# Patient Record
Sex: MALE | Race: BLACK OR AFRICAN AMERICAN | NOT HISPANIC OR LATINO | Employment: OTHER | URBAN - METROPOLITAN AREA
[De-identification: names, ages, dates, MRNs, and addresses within clinical notes are randomized per-mention and may not be internally consistent; named-entity substitution may affect disease eponyms.]

---

## 2022-07-11 DIAGNOSIS — D35.2 BENIGN NEOPLASM OF PITUITARY GLAND: Primary | ICD-10-CM

## 2022-07-21 ENCOUNTER — TELEPHONE (OUTPATIENT)
Dept: NEUROSURGERY | Facility: CLINIC | Age: 67
End: 2022-07-21
Payer: MEDICARE

## 2022-07-21 DIAGNOSIS — D35.2 PITUITARY ADENOMA: Primary | ICD-10-CM

## 2022-07-21 NOTE — TELEPHONE ENCOUNTER
Called patient to inquire about if he has an endocrinologist/ophthalmologist. Patient denied having either so I informed him that I would send a referral to Dr. Lopez and [facility]. Referrals were faxed and I will call Dr. Doshi's office to schedule eye exam and visual field test per MM instructions.

## 2022-07-22 NOTE — TELEPHONE ENCOUNTER
Called Dr. Doshi's office and sched the patient's eye exam and visual field test for 7-26-22 @ 8:30am

## 2022-08-01 ENCOUNTER — TELEPHONE (OUTPATIENT)
Dept: NEUROSURGERY | Facility: CLINIC | Age: 67
End: 2022-08-01
Payer: MEDICARE

## 2022-08-01 NOTE — TELEPHONE ENCOUNTER
I discussed the patient and his situation with Dr. Vasquez.  The MRIs were reviewed.  The lesion has grown over time.  Dr. Vasquez favors excision being it has grown.  It will likely continue to grow.  It is okay to watch if the patient desires as long as not hypersecreting tumor.      You can add him to a Wednesday in my schedule on a day Dr. Vasquez is here.  No new imaging.

## 2022-08-25 NOTE — TELEPHONE ENCOUNTER
The patient was originally scheduled on 9/14/22 on  AA day per MM. Since then we have found out Dr. Vasquez will not be in office so his appt had to be rescheduled to 9/28/22. Pt verbalized understanding and was compliant with appt date and time.

## 2022-09-28 ENCOUNTER — OFFICE VISIT (OUTPATIENT)
Dept: NEUROSURGERY | Facility: CLINIC | Age: 67
End: 2022-09-28
Payer: MEDICARE

## 2022-09-28 VITALS
WEIGHT: 171.63 LBS | BODY MASS INDEX: 28.6 KG/M2 | SYSTOLIC BLOOD PRESSURE: 130 MMHG | DIASTOLIC BLOOD PRESSURE: 83 MMHG | HEART RATE: 85 BPM | HEIGHT: 65 IN | RESPIRATION RATE: 18 BRPM

## 2022-09-28 DIAGNOSIS — M47.812 SPONDYLOSIS OF CERVICAL REGION WITHOUT MYELOPATHY OR RADICULOPATHY: ICD-10-CM

## 2022-09-28 DIAGNOSIS — D35.2 BENIGN NEOPLASM OF PITUITARY GLAND: Primary | ICD-10-CM

## 2022-09-28 PROCEDURE — 99204 PR OFFICE/OUTPT VISIT, NEW, LEVL IV, 45-59 MIN: ICD-10-PCS | Mod: ,,, | Performed by: PHYSICIAN ASSISTANT

## 2022-09-28 PROCEDURE — 99204 OFFICE O/P NEW MOD 45 MIN: CPT | Mod: ,,, | Performed by: PHYSICIAN ASSISTANT

## 2022-09-28 RX ORDER — FLUTICASONE PROPIONATE 50 UG/1
2 POWDER, METERED RESPIRATORY (INHALATION) 2 TIMES DAILY
COMMUNITY

## 2022-09-28 RX ORDER — TADALAFIL 20 MG/1
20 TABLET ORAL DAILY
COMMUNITY
End: 2024-03-20

## 2022-09-28 RX ORDER — LEVOCETIRIZINE DIHYDROCHLORIDE 5 MG/1
5 TABLET, FILM COATED ORAL NIGHTLY
COMMUNITY

## 2022-09-28 RX ORDER — TAMSULOSIN HYDROCHLORIDE 0.4 MG/1
1 CAPSULE ORAL NIGHTLY
COMMUNITY
Start: 2022-05-31

## 2022-09-28 RX ORDER — METFORMIN HYDROCHLORIDE 500 MG/1
500 TABLET ORAL 2 TIMES DAILY WITH MEALS
COMMUNITY

## 2022-09-28 RX ORDER — CETIRIZINE HYDROCHLORIDE 10 MG/1
TABLET ORAL DAILY
COMMUNITY
Start: 2021-10-28 | End: 2022-11-28

## 2022-09-28 RX ORDER — ROSUVASTATIN CALCIUM 20 MG/1
20 TABLET, COATED ORAL DAILY
COMMUNITY
End: 2024-03-20

## 2022-09-28 NOTE — PROGRESS NOTES
"[facility]  History & Physical  Neurosurgery      Jacques Garcia   [id number]   1955     SUBJECTIVE:     Chief Complaint    History of Present Illness:   Patient is a 67 y.o. male is seen today for pituitary tumor.  The patient was found to have a pituitary tumor incidentally after suffering a TIA in 2019.  At the time, he experienced transient blurred vision followed by numbness in the left side of his face and tongue.  He has followed the pituitary lesion with serial imaging with his family physician.  The most recent pituitary MRI with and without contrast on 5/25/2022 reveal an increase in size of the right sided lesion.  There is also "increasingly evident smaller hypo-enhancing 4 mm mas in the left lob of the pituitary".  He was then referred to our office for neurosurgical evaluation and care.      Currently, he has no complaints.  He denies visual disturbances.  He was evaluated by Dr. Lavonne Curtis on 7/26/2022.  Visual field testing was intact.  He is treated for diabetes mellitus which he reports is under control with medication.  He does not believe he has undergone lab work for the pituitary gland in the past.  He is to see Dr. Casimiro Lopez soon.    In addition, he has been seen by Dr. James Almanza in the past for cervical radiculopathy.  Surgery was recommended.  However, the patient declined.  His symptoms improved.  Currently, the neck pain is manageable.  The radiculopathy resolved.  He continues with numbness in the left first and second fingers which is not a problem for him.      Past Medical History:   Diagnosis Date    BPH (benign prostatic hyperplasia)     Cervical disc disorder     DM (diabetes mellitus)     HLD (hyperlipidemia)     HTN (hypertension)     Insomnia     Prostate cancer 2016    treated with radiation        History reviewed. No surgical history.       Review of patient's allergies indicates:  No Known Allergies       Social History     Tobacco Use    Smoking " "status: Never    Smokeless tobacco: Never   Substance Use Topics    Alcohol use: Yes     Comment: socially        Family History   Problem Relation Age of Onset    Hypertension Father     Diabetes Father         Review of Systems   Constitutional:  Negative for chills and fever.   HENT:  Negative for congestion and hearing loss.    Eyes:  Negative for blurred vision and double vision.   Respiratory:  Negative for cough and wheezing.    Cardiovascular:  Negative for chest pain and palpitations.   Gastrointestinal:  Negative for nausea and vomiting.   Genitourinary:  Negative for urgency.   Musculoskeletal:  Positive for neck pain.   Skin:  Negative for rash.   Neurological:  Negative for dizziness and headaches.   Endo/Heme/Allergies:  Negative for polydipsia.   Psychiatric/Behavioral:  Negative for hallucinations.        OBJECTIVE:       /83 (BP Location: Other (Comment), Patient Position: Sitting)   Pulse 85   Resp 18   Ht 5' 5" (1.651 m)   Wt 77.8 kg (171 lb 9.6 oz)   BMI 28.56 kg/m²       General:  healthy, alert, no distress     Head:  Normocephalic, without obvious abnormality, atraumatic    CV:  Neck is supple.  There are no carotid bruits.  Heart has regular rate and rhythm    Lungs:   Clear to ascultation bilaterally    Abdomen:  Soft, non-tender, non-distended    Neurological:    Oriented to Person, Place, Time   Speech is normal.  Memory, cognition, and affect are appropriate.  Pupils are equal, round, and reactive to light,  Extraocular movements are intact.  Visual fields are full to confrontation.  Sensation is intact throughout face as well as extremities.  Movements of facial expression are intact and symmetric.  Motor examination reveals 5/5 strength throughout upper and lower extremities with exception of 5-/5 strength with bilateral elbow extension.  Gait:  normal  He has a little difficulty with walking in tandem      Imaging:  The patient presents today with MRI of the brain and " pituitary from 5/25/2020, 05/03/2021, and 05/25/2022.  As above there has been increase in size of the pituitary lesion in the right lobe.  In addition, there is increasing evidence of lesion at at the left side of the pituitary as well.      ASSESSMENT/PLAN:     1. Benign neoplasm of pituitary gland      MRI Pituitary W W/O Contrast      2. Spondylosis of cervical region without myelopathy or radiculopathy            The patient and his situation was discussed with Dr. Vasquez prior to his visit.  All the patient's imaging was reviewed by Dr. Vasquez.  Being the lesion has increased in size, Dr. Vasquez favors excision of the pituitary lesion.  However, if the tumor is non secreting, Dr. Vasquez is okay with continued surveillance if the patient desires.  In discussing with the patient, he is not interested in surgery at this time.  He will see Dr. Lopez in the near future.  We will have him return for follow-up with repeat pituitary MRI at 2 months and follow-up thereafter.  This will be 6 months after the last pituitary MRI.      A total of 31 minutes was spent face-to-face with the patient during this encounter.  Over half of that time was spent on counseling and coordination of care.  Additional time was used to reviewed the patient's chart including notes from Dr. Biswas and Dr. Curtis, MRI brain imaging and reports comparing the most recent to the older, and work on office note.

## 2022-10-04 PROBLEM — M47.812 SPONDYLOSIS OF CERVICAL REGION WITHOUT MYELOPATHY OR RADICULOPATHY: Status: ACTIVE | Noted: 2022-10-04

## 2022-11-02 ENCOUNTER — TELEPHONE (OUTPATIENT)
Dept: NEUROSURGERY | Facility: CLINIC | Age: 67
End: 2022-11-02
Payer: MEDICARE

## 2022-11-28 ENCOUNTER — OFFICE VISIT (OUTPATIENT)
Dept: NEUROSURGERY | Facility: CLINIC | Age: 67
End: 2022-11-28
Payer: MEDICARE

## 2022-11-28 VITALS
DIASTOLIC BLOOD PRESSURE: 85 MMHG | WEIGHT: 174 LBS | SYSTOLIC BLOOD PRESSURE: 145 MMHG | HEART RATE: 87 BPM | BODY MASS INDEX: 28.99 KG/M2 | HEIGHT: 65 IN | RESPIRATION RATE: 18 BRPM

## 2022-11-28 DIAGNOSIS — D35.2 BENIGN NEOPLASM OF PITUITARY GLAND: Primary | ICD-10-CM

## 2022-11-28 DIAGNOSIS — M47.812 SPONDYLOSIS OF CERVICAL REGION WITHOUT MYELOPATHY OR RADICULOPATHY: ICD-10-CM

## 2022-11-28 PROCEDURE — 99215 PR OFFICE/OUTPT VISIT, EST, LEVL V, 40-54 MIN: ICD-10-PCS | Mod: ,,, | Performed by: PHYSICIAN ASSISTANT

## 2022-11-28 PROCEDURE — 99215 OFFICE O/P EST HI 40 MIN: CPT | Mod: ,,, | Performed by: PHYSICIAN ASSISTANT

## 2022-11-28 RX ORDER — OXYCODONE AND ACETAMINOPHEN 10; 325 MG/1; MG/1
TABLET ORAL EVERY 12 HOURS PRN
Status: ON HOLD | COMMUNITY
End: 2023-03-29 | Stop reason: SDUPTHER

## 2022-11-28 RX ORDER — LOSARTAN POTASSIUM 25 MG/1
TABLET ORAL DAILY
COMMUNITY
End: 2023-01-09

## 2022-11-28 RX ORDER — MONTELUKAST SODIUM 10 MG/1
10 TABLET ORAL NIGHTLY
COMMUNITY
End: 2023-07-28

## 2022-11-28 NOTE — PROGRESS NOTES
"[facility]  History & Physical  Neurosurgery      Jacques Garcia   [id number]   1955       SUBJECTIVE:     Chief Complaint:    Follow-up pituitary tumor    History of Present Illness:   Patient is a 67 y.o. male is seen today in follow-up with repeat MRI pituitary.  The patient was found to have a pituitary tumor incidentally after suffering a TIA in 2019.  At the time, he experienced transient blurred vision followed by numbness in the left side of his face and tongue.  He has followed the pituitary lesion with serial imaging with his family physician.  The most recent pituitary MRI with and without contrast on 5/25/2022 reveal an increase in size of the right sided lesion.  There is also "increasingly evident smaller hypo-enhancing 4 mm mas in the left lob of the pituitary".      I saw the patient in the office on 09/28/2022.  He is asymptomatic.  He has been seen by Dr. Casimiro Lopez.  Pituitary lesion is nonfunctioning.      Past Medical History:   Diagnosis Date    BPH (benign prostatic hyperplasia)     Cervical disc disorder     DM (diabetes mellitus)     HLD (hyperlipidemia)     HTN (hypertension)     Insomnia     Prostate cancer 2016    treated with radiation        History reviewed. No pertinent surgical history.     Social History     Tobacco Use    Smoking status: Never    Smokeless tobacco: Never   Substance Use Topics    Alcohol use: Yes     Comment: socially        Family History   Problem Relation Age of Onset    Hypertension Father     Diabetes Father         Review of patient's allergies indicates:  No Known Allergies     Medication List with Changes/Refills   Current Medications    FLUTICASONE PROPIONATE (FLOVENT DISKUS) 50 MCG/ACTUATION DSDV    Inhale into the lungs. Controller    LEVOCETIRIZINE (XYZAL) 5 MG TABLET    Take 5 mg by mouth every evening.    LOSARTAN (COZAAR) 25 MG TABLET    once daily.    METFORMIN (GLUCOPHAGE) 500 MG TABLET    Take 500 mg by mouth 2 (two) times " "daily with meals.    MONTELUKAST (SINGULAIR) 10 MG TABLET    Take 10 mg by mouth every evening.    OXYCODONE-ACETAMINOPHEN (PERCOCET)  MG PER TABLET    every 12 (twelve) hours as needed.    ROSUVASTATIN (CRESTOR) 20 MG TABLET    Take 20 mg by mouth once daily.    TADALAFIL (CIALIS) 20 MG TAB    Take 20 mg by mouth once daily.    TAMSULOSIN (FLOMAX) 0.4 MG CAP    Take 1 capsule by mouth every evening.   Discontinued Medications    CETIRIZINE (ZYRTEC) 10 MG TABLET    once daily.        Review of Systems   Constitutional:  Negative for chills and fever.   HENT:  Negative for congestion and hearing loss.    Eyes:  Negative for blurred vision and double vision.   Respiratory:  Negative for cough and wheezing.    Cardiovascular:  Negative for chest pain and palpitations.   Gastrointestinal:  Negative for nausea and vomiting.   Genitourinary:  Negative for urgency.   Musculoskeletal:  Positive for neck pain.   Skin:  Negative for rash.   Neurological:  Negative for dizziness and headaches.   Endo/Heme/Allergies:  Negative for polydipsia.   Psychiatric/Behavioral:  Negative for hallucinations.        OBJECTIVE:     Vital Signs:  BP (!) 145/85 (BP Location: Other (Comment), Patient Position: Sitting)   Pulse 87   Resp 18   Ht 5' 5" (1.651 m)   Wt 78.9 kg (174 lb)   BMI 28.96 kg/m²      General:  healthy, alert, no distress    Head:  Normocephalic, without obvious abnormality, atraumatic    Lungs:   Breathing is quiet, non-lablored    Neurological:    Oriented to Person, Place, Time   Speech is normal.  Memory, cognition, and affect are appropriate.  Pupils are equal, round, and reactive to light,  Extraocular movements are intact.  Visual fields are full to confrontation.  Sensation is intact throughout face as well as extremities.  Movements of facial expression are intact and symmetric.  Motor examination reveals 5/5 strength throughout upper and lower extremities with exception of 5-/5 strength with bilateral " elbow extension.  Gait:  normal  He has a little difficulty with walking in tandem        Imaging:  The patient presents today with MRI of the brain and pituitary from 5/25/2020, 05/03/2021, and 05/25/2022.  As above there has been increase in size of the pituitary lesion in the right lobe.  In addition, there is increasing evidence of lesion at at the left side of the pituitary as well.  MRI of the pituitary with and without contrast was obtained on 11/21/2022.  Compared to MRI from 05/25/2022, there is stable appearance of the pituitary gland and lesions.      ASSESSMENT/PLAN:     1. Benign neoplasm of pituitary gland        2. Spondylosis of cervical region without myelopathy or radiculopathy          The patient was seen and examined by Dr. Vasquez.  Options were discussed at length.  This included continued observation with serial images versus surgical excision.  Risks, benefits, and possible complications were discussed.  All the patient's questions were answered.  He would like to proceed with surgery for excision of pituitary tumor.  We will have the patient see Dr. Reyes and plan accordingly for transnasal transsphenoidal pituitary tumor resection.      A total of 37 minutes was spent face-to-face with the patient during this encounter.  Over half of that time was spent on counseling and coordination of care.  Additional time was used to reviewed the patient's chart, MRI pituitary, discussed with Dr. Vasquez, and work on office note.          Nataly Hurtado PA-C

## 2022-12-01 ENCOUNTER — TELEPHONE (OUTPATIENT)
Dept: NEUROSURGERY | Facility: CLINIC | Age: 67
End: 2022-12-01
Payer: MEDICARE

## 2022-12-01 DIAGNOSIS — D35.2 BENIGN NEOPLASM OF PITUITARY GLAND: Primary | ICD-10-CM

## 2022-12-01 NOTE — TELEPHONE ENCOUNTER
----- Message from Nataly Hurtado PA-C sent at 11/28/2022  1:49 PM CST -----  Regarding: surgery  He will be TSH for pituitary tumor.  He will need to see [place].

## 2023-01-09 ENCOUNTER — OFFICE VISIT (OUTPATIENT)
Dept: NEUROSURGERY | Facility: CLINIC | Age: 68
End: 2023-01-09
Payer: MEDICARE

## 2023-01-09 ENCOUNTER — HOSPITAL ENCOUNTER (OUTPATIENT)
Dept: RADIOLOGY | Facility: HOSPITAL | Age: 68
Discharge: HOME | End: 2023-01-09
Attending: NURSE PRACTITIONER
Payer: MEDICARE

## 2023-01-09 VITALS
RESPIRATION RATE: 16 BRPM | SYSTOLIC BLOOD PRESSURE: 125 MMHG | HEIGHT: 65 IN | DIASTOLIC BLOOD PRESSURE: 80 MMHG | HEART RATE: 67 BPM | WEIGHT: 177 LBS | BODY MASS INDEX: 29.49 KG/M2

## 2023-01-09 DIAGNOSIS — D68.9 COAGULATION DEFECT: ICD-10-CM

## 2023-01-09 DIAGNOSIS — I10 HYPERTENSION, UNSPECIFIED TYPE: ICD-10-CM

## 2023-01-09 DIAGNOSIS — E11.9 TYPE 2 DIABETES MELLITUS WITHOUT COMPLICATION, UNSPECIFIED WHETHER LONG TERM INSULIN USE: ICD-10-CM

## 2023-01-09 DIAGNOSIS — D35.2 BENIGN NEOPLASM OF PITUITARY GLAND: Primary | ICD-10-CM

## 2023-01-09 PROCEDURE — 71046 X-RAY EXAM CHEST 2 VIEWS: CPT | Mod: TC

## 2023-01-09 PROCEDURE — 99213 OFFICE O/P EST LOW 20 MIN: CPT | Mod: ,,, | Performed by: NURSE PRACTITIONER

## 2023-01-09 PROCEDURE — 99213 PR OFFICE/OUTPT VISIT, EST, LEVL III, 20-29 MIN: ICD-10-PCS | Mod: ,,, | Performed by: NURSE PRACTITIONER

## 2023-01-09 RX ORDER — ALBUTEROL SULFATE 90 UG/1
2 AEROSOL, METERED RESPIRATORY (INHALATION) EVERY 4 HOURS PRN
COMMUNITY

## 2023-01-09 RX ORDER — PREDNISONE 20 MG/1
TABLET ORAL
Status: ON HOLD | COMMUNITY
End: 2023-03-28 | Stop reason: CLARIF

## 2023-01-09 RX ORDER — LOSARTAN POTASSIUM 50 MG/1
50 TABLET ORAL DAILY
COMMUNITY
End: 2024-03-20

## 2023-01-09 NOTE — PATIENT INSTRUCTIONS
-Take flomax and losartan the morning of surgery with a sip of water. Otherwise, nothing to eat or drink after midnight the night before surgery.

## 2023-01-09 NOTE — PROGRESS NOTES
"[facility]  Office Visit  Neurosurgery  Jacques Garcia  [id number]  1955    HPI:  The patient presents today for pre-operative appointment. Mr. Garcia is a 67 y.o. male with incidental finding of pituitary tumor during workup for TIA in 2019.   At the time, he experienced transient blurred vision followed by numbness in the left side of his face and tongue.  He has followed the pituitary lesion with serial imaging with his family physician.  Pituitary MRI with and without contrast on 5/25/2022 revealed an increase in size of the right sided lesion.  There was also "increasingly evident smaller hypo-enhancing 4 mm mass in the left lobe of the pituitary".  He was then referred to our office for neurosurgical evaluation and care.  He returned to the office in November with repeat MRI pituitary which revealed stable appearance of pituitary gland and lesions. Surgery was recommended at that time. He presents today for his pre-operative appointment and is ready to proceed with surgery as planned.      He remains asymptomatic from pituitary lesions. He denies any visual disturbances or headaches.  He was evaluated by Dr. Lavonne Curtis on 7/26/2022.  Visual field testing was intact. He has been evaluated by Dr. Lopez and tumor is nonfunctioning. He denies any changes in bowel or bladder function.     Review of patient's allergies indicates:  No Known Allergies  Current Outpatient Medications   Medication Sig Dispense Refill    albuterol (PROVENTIL/VENTOLIN HFA) 90 mcg/actuation inhaler as needed.      fluticasone propionate (FLOVENT DISKUS) 50 mcg/actuation DsDv Inhale into the lungs. Controller      levocetirizine (XYZAL) 5 MG tablet Take 5 mg by mouth every evening.      losartan (COZAAR) 50 MG tablet once daily.      metFORMIN (GLUCOPHAGE) 500 MG tablet Take 500 mg by mouth 2 (two) times daily with meals.      montelukast (SINGULAIR) 10 mg tablet Take 10 mg by mouth every evening.      " "oxyCODONE-acetaminophen (PERCOCET)  mg per tablet every 12 (twelve) hours as needed.      rosuvastatin (CRESTOR) 20 MG tablet Take 20 mg by mouth once daily.      tadalafiL (CIALIS) 20 MG Tab Take 20 mg by mouth once daily.      tamsulosin (FLOMAX) 0.4 mg Cap Take 1 capsule by mouth every evening.      predniSONE (DELTASONE) 20 MG tablet prednisone 20 mg tablet   Take 1 tablet every day by oral route for 5 days.       No current facility-administered medications for this visit.     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  Social History     Tobacco Use    Smoking status: Never    Smokeless tobacco: Never   Substance Use Topics    Alcohol use: Yes     Comment: socially     Family History   Problem Relation Age of Onset    Hypertension Father     Diabetes Father        Vital Signs  Pulse: 67  Resp: 16  BP: 125/80  BP Location: Other (Comment)  Patient Position: Sitting  Pain Score: 0-No pain  Height and Weight  Height: 5' 5" (165.1 cm)  Weight: 80.3 kg (177 lb)  BSA (Calculated - sq m): 1.92 sq meters  BMI (Calculated): 29.5  Weight in (lb) to have BMI = 25: 149.9]    ROS:  Review of Systems   All other systems reviewed and are negative.    Vitals within last 24hrs:  Vitals:    01/09/23 0906   BP: 125/80   Pulse: 67   Resp: 16       Physical Exam:  General: well developed, well nourished, no distress.   Head: normocephalic, atraumatic  Respiratory: respiration non-labored; clear to " auscultation  Cardiac: RRR, No murmur  GI: abd soft, non-tender, non-distended  Pulses: 2+ and symmetric radial and dorsalis pedis. No lower extremity edema  Neurologic: Alert and oriented. Thought content appropriate.  GCS: Motor: 6/Verbal: 5/Eyes: 4 GCS Total: 15  Mental Status: Awake, Alert, Oriented x3  Cranial nerves: face symmetric, tongue midline, CN II-XII grossly intact.   Eyes: pupils equal, round, reactive to light with accomodation, EOMI.   Sensory: intact to light touch throughout  Motor Strength:Moves all extremities spontaneously with good tone.  Full strength upper and lower extremities. No abnormal movements seen  Gait: normal      Assessment/Plan:  Problem List Items Addressed This Visit          Endocrine    Benign neoplasm of pituitary gland - Primary    Relevant Orders    CBC Auto Differential    Comprehensive Metabolic Panel    MRI Brain Stealth     Other Visit Diagnoses       Type 2 diabetes mellitus without complication, unspecified whether long term insulin use        Relevant Orders    CBC Auto Differential    Comprehensive Metabolic Panel    Hypertension, unspecified type        Relevant Orders    CBC Auto Differential    Comprehensive Metabolic Panel    X-Ray Chest PA And Lateral    EKG 12-lead    Coagulation defect        Relevant Orders    CBC Auto Differential    Comprehensive Metabolic Panel    Protime-INR    APTT          PLAN  The nature of the procedure, as well as its attendant risks, were discussed in detail with the patient.  All questions were answered.  They are agreement with proceeding with surgery as planned, and are tentatively scheduled for endoscopic transnasal, trans-sphenoidal pituitary tumor resection with Dr. Sofia on 1/9/2023.      ROCAEL Campos-AVELINOP

## 2023-01-12 ENCOUNTER — TELEPHONE (OUTPATIENT)
Dept: NEUROSURGERY | Facility: CLINIC | Age: 68
End: 2023-01-12
Payer: MEDICARE

## 2023-01-12 DIAGNOSIS — D35.3 BENIGN NEOPLASM OF PITUITARY GLAND AND CRANIOPHARYNGEAL DUCT (POUCH): ICD-10-CM

## 2023-01-12 DIAGNOSIS — D35.2 BENIGN NEOPLASM OF PITUITARY GLAND: Primary | ICD-10-CM

## 2023-01-12 DIAGNOSIS — D35.2 BENIGN NEOPLASM OF PITUITARY GLAND AND CRANIOPHARYNGEAL DUCT (POUCH): ICD-10-CM

## 2023-01-12 RX ORDER — MUPIROCIN 20 MG/G
1 OINTMENT TOPICAL 2 TIMES DAILY
Status: CANCELLED | OUTPATIENT
Start: 2023-01-12 | End: 2023-01-13

## 2023-01-13 ENCOUNTER — LAB VISIT (OUTPATIENT)
Dept: LAB | Facility: HOSPITAL | Age: 68
End: 2023-01-13
Attending: NEUROLOGICAL SURGERY
Payer: MEDICARE

## 2023-01-13 ENCOUNTER — TELEPHONE (OUTPATIENT)
Dept: NEUROSURGERY | Facility: CLINIC | Age: 68
End: 2023-01-13
Payer: MEDICARE

## 2023-01-13 DIAGNOSIS — R79.1 ABNORMAL PARTIAL THROMBOPLASTIN TIME (PTT): ICD-10-CM

## 2023-01-13 DIAGNOSIS — R79.1 ABNORMAL PARTIAL THROMBOPLASTIN TIME (PTT): Primary | ICD-10-CM

## 2023-01-13 DIAGNOSIS — D68.9 COAGULATION DEFECT: Primary | ICD-10-CM

## 2023-01-13 DIAGNOSIS — D68.9 COAGULATION DEFECT: ICD-10-CM

## 2023-01-13 PROCEDURE — 82043 UR ALBUMIN QUANTITATIVE: CPT | Mod: 59

## 2023-01-13 PROCEDURE — 85730 THROMBOPLASTIN TIME PARTIAL: CPT | Mod: 59

## 2023-01-13 PROCEDURE — 36415 COLL VENOUS BLD VENIPUNCTURE: CPT

## 2023-01-16 LAB
APTT PPP: 37.5 SECONDS (ref 23.2–33.7)
PROTHROMBIN TIME: 12.4 SECONDS (ref 12.2–14.7)

## 2023-01-17 DIAGNOSIS — R79.1 ELEVATED PARTIAL THROMBOPLASTIN TIME (PTT): Primary | ICD-10-CM

## 2023-01-18 ENCOUNTER — TELEPHONE (OUTPATIENT)
Dept: NEUROSURGERY | Facility: CLINIC | Age: 68
End: 2023-01-18
Payer: MEDICARE

## 2023-01-19 NOTE — TELEPHONE ENCOUNTER
Returned patient's call. Had to leave a message. Of note, we referred him to Dr. Wooten.  I do see he has an appt with Hem/Onc on 1/23/23.

## 2023-01-23 ENCOUNTER — OFFICE VISIT (OUTPATIENT)
Dept: HEMATOLOGY/ONCOLOGY | Facility: CLINIC | Age: 68
End: 2023-01-23
Payer: MEDICARE

## 2023-01-23 VITALS
WEIGHT: 174.69 LBS | HEART RATE: 63 BPM | TEMPERATURE: 98 F | BODY MASS INDEX: 29.11 KG/M2 | RESPIRATION RATE: 16 BRPM | OXYGEN SATURATION: 98 % | HEIGHT: 65 IN | DIASTOLIC BLOOD PRESSURE: 85 MMHG | SYSTOLIC BLOOD PRESSURE: 150 MMHG

## 2023-01-23 DIAGNOSIS — R79.1 ELEVATED PARTIAL THROMBOPLASTIN TIME (PTT): ICD-10-CM

## 2023-01-23 LAB
ALBUMIN SERPL-MCNC: 4.2 G/DL (ref 3.4–4.8)
ALBUMIN/GLOB SERPL: 1.7 RATIO (ref 1.1–2)
ALP SERPL-CCNC: 62 UNIT/L (ref 40–150)
ALT SERPL-CCNC: 17 UNIT/L (ref 0–55)
APTT PPP: 39.7 SECONDS (ref 23.2–33.7)
AST SERPL-CCNC: 13 UNIT/L (ref 5–34)
BASOPHILS # BLD AUTO: 0.03 X10(3)/MCL (ref 0–0.2)
BASOPHILS NFR BLD AUTO: 0.7 %
BILIRUBIN DIRECT+TOT PNL SERPL-MCNC: 0.4 MG/DL
BUN SERPL-MCNC: 10.1 MG/DL (ref 8.4–25.7)
CALCIUM SERPL-MCNC: 9.5 MG/DL (ref 8.8–10)
CHLORIDE SERPL-SCNC: 110 MMOL/L (ref 98–107)
CO2 SERPL-SCNC: 26 MMOL/L (ref 23–31)
CREAT SERPL-MCNC: 0.78 MG/DL (ref 0.73–1.18)
EOSINOPHIL # BLD AUTO: 0.31 X10(3)/MCL (ref 0–0.9)
EOSINOPHIL NFR BLD AUTO: 6.8 %
ERYTHROCYTE [DISTWIDTH] IN BLOOD BY AUTOMATED COUNT: 14.7 % (ref 11.5–17)
FACT VIII ACT/NOR PPP: 126 % (ref 59–191)
FIBRINOGEN PPP-MCNC: 326 MG/DL (ref 210–463)
GFR SERPLBLD CREATININE-BSD FMLA CKD-EPI: >60 MLS/MIN/1.73/M2
GLOBULIN SER-MCNC: 2.5 GM/DL (ref 2.4–3.5)
GLUCOSE SERPL-MCNC: 92 MG/DL (ref 82–115)
HCT VFR BLD AUTO: 39.6 % (ref 42–52)
HGB BLD-MCNC: 12.4 GM/DL (ref 14–18)
IMM GRANULOCYTES # BLD AUTO: 0 X10(3)/MCL (ref 0–0.04)
IMM GRANULOCYTES NFR BLD AUTO: 0 %
INR BLD: 0.99 (ref 0–1.3)
LYMPHOCYTES # BLD AUTO: 1.23 X10(3)/MCL (ref 0.6–4.6)
LYMPHOCYTES NFR BLD AUTO: 26.8 %
MCH RBC QN AUTO: 26.2 PG
MCHC RBC AUTO-ENTMCNC: 31.3 MG/DL (ref 33–36)
MCV RBC AUTO: 83.5 FL (ref 80–94)
MONOCYTES # BLD AUTO: 0.43 X10(3)/MCL (ref 0.1–1.3)
MONOCYTES NFR BLD AUTO: 9.4 %
NEUTROPHILS # BLD AUTO: 2.59 X10(3)/MCL (ref 2.1–9.2)
NEUTROPHILS NFR BLD AUTO: 56.3 %
PLATELET # BLD AUTO: 259 X10(3)/MCL (ref 130–400)
PMV BLD AUTO: 10.1 FL (ref 7.4–10.4)
POTASSIUM SERPL-SCNC: 4.5 MMOL/L (ref 3.5–5.1)
PROT SERPL-MCNC: 6.7 GM/DL (ref 5.8–7.6)
PROTHROMBIN TIME: 13 SECONDS (ref 12.5–14.5)
RBC # BLD AUTO: 4.74 X10(6)/MCL (ref 4.7–6.1)
SODIUM SERPL-SCNC: 142 MMOL/L (ref 136–145)
TT IMM BOVINE THROMBIN PPP: 16 SECONDS (ref 0–22)
WBC # SPEC AUTO: 4.6 X10(3)/MCL (ref 4.5–11.5)

## 2023-01-23 PROCEDURE — 85240 CLOT FACTOR VIII AHG 1 STAGE: CPT | Performed by: STUDENT IN AN ORGANIZED HEALTH CARE EDUCATION/TRAINING PROGRAM

## 2023-01-23 PROCEDURE — 99999 PR PBB SHADOW E&M-EST. PATIENT-LVL IV: ICD-10-PCS | Mod: PBBFAC,,, | Performed by: STUDENT IN AN ORGANIZED HEALTH CARE EDUCATION/TRAINING PROGRAM

## 2023-01-23 PROCEDURE — 85730 THROMBOPLASTIN TIME PARTIAL: CPT | Performed by: STUDENT IN AN ORGANIZED HEALTH CARE EDUCATION/TRAINING PROGRAM

## 2023-01-23 PROCEDURE — 85613 RUSSELL VIPER VENOM DILUTED: CPT | Performed by: STUDENT IN AN ORGANIZED HEALTH CARE EDUCATION/TRAINING PROGRAM

## 2023-01-23 PROCEDURE — 85670 THROMBIN TIME PLASMA: CPT | Performed by: STUDENT IN AN ORGANIZED HEALTH CARE EDUCATION/TRAINING PROGRAM

## 2023-01-23 PROCEDURE — 99205 PR OFFICE/OUTPT VISIT, NEW, LEVL V, 60-74 MIN: ICD-10-PCS | Mod: S$PBB,,, | Performed by: STUDENT IN AN ORGANIZED HEALTH CARE EDUCATION/TRAINING PROGRAM

## 2023-01-23 PROCEDURE — 85025 COMPLETE CBC W/AUTO DIFF WBC: CPT | Performed by: STUDENT IN AN ORGANIZED HEALTH CARE EDUCATION/TRAINING PROGRAM

## 2023-01-23 PROCEDURE — 99999 PR PBB SHADOW E&M-EST. PATIENT-LVL IV: CPT | Mod: PBBFAC,,, | Performed by: STUDENT IN AN ORGANIZED HEALTH CARE EDUCATION/TRAINING PROGRAM

## 2023-01-23 PROCEDURE — 36415 COLL VENOUS BLD VENIPUNCTURE: CPT | Performed by: STUDENT IN AN ORGANIZED HEALTH CARE EDUCATION/TRAINING PROGRAM

## 2023-01-23 PROCEDURE — 99205 OFFICE O/P NEW HI 60 MIN: CPT | Mod: S$PBB,,, | Performed by: STUDENT IN AN ORGANIZED HEALTH CARE EDUCATION/TRAINING PROGRAM

## 2023-01-23 PROCEDURE — 85610 PROTHROMBIN TIME: CPT | Performed by: STUDENT IN AN ORGANIZED HEALTH CARE EDUCATION/TRAINING PROGRAM

## 2023-01-23 PROCEDURE — 85384 FIBRINOGEN ACTIVITY: CPT

## 2023-01-23 PROCEDURE — 85610 PROTHROMBIN TIME: CPT | Mod: 91 | Performed by: STUDENT IN AN ORGANIZED HEALTH CARE EDUCATION/TRAINING PROGRAM

## 2023-01-23 PROCEDURE — 85730 THROMBOPLASTIN TIME PARTIAL: CPT | Mod: 59 | Performed by: STUDENT IN AN ORGANIZED HEALTH CARE EDUCATION/TRAINING PROGRAM

## 2023-01-23 PROCEDURE — 80053 COMPREHEN METABOLIC PANEL: CPT | Performed by: STUDENT IN AN ORGANIZED HEALTH CARE EDUCATION/TRAINING PROGRAM

## 2023-01-23 PROCEDURE — 99214 OFFICE O/P EST MOD 30 MIN: CPT | Mod: PBBFAC | Performed by: STUDENT IN AN ORGANIZED HEALTH CARE EDUCATION/TRAINING PROGRAM

## 2023-01-23 NOTE — PROGRESS NOTES
Subjective:       Patient ID: Jacques Garcia is a 68 y.o. male.    Chief Complaint: No chief complaint on file.    Diagnosis: Prolonged PTT    Current Treatment: None    Treatment History: N/A    HPI:  67yo M who presents for evaluation of prolonged PTT. Patient was planning to undergo surgery for resection of a nonfunctioning pituitary tumor in January 2022 when preoperative labs revealed coagulation studies of a normal PT and INR, however a prolonged PTT at 40 seconds. He was referred to hematology for further evaluation.     Today he has no complaints. He has never had any bleeding, bruising, or clotting disorders. He has never had surgery before. Denies any prolonged bleeding incidents after minor trauma or dental exams. No family history of hematolologic issues. He is in good health. Denies any unintentional weight loss. Denies any OTC medication use other than what is prescribed. No hepatic dysfunction he is aware of.     Past Medical History:   Diagnosis Date    Benign neoplasm of pituitary gland     BPH (benign prostatic hyperplasia)     Cervical disc disorder     DM (diabetes mellitus)     Heartburn     HLD (hyperlipidemia)     HTN (hypertension)     Insomnia     Kidney stones     Prostate cancer 2016    treated with radiation    SOB (shortness of breath)     Spondylosis of cervical region without myelopathy or radiculopathy       Past Surgical History:   Procedure Laterality Date    CATARACT EXTRACTION Bilateral     COLONOSCOPY      MULTIPLE TOOTH EXTRACTIONS      neck cyst       Social History     Socioeconomic History    Marital status:    Tobacco Use    Smoking status: Never    Smokeless tobacco: Never   Substance and Sexual Activity    Alcohol use: Not Currently    Drug use: Never    Sexual activity: Not Currently      Family History   Problem Relation Age of Onset    Diabetes Mother     Colon cancer Mother     Hypertension Father     Diabetes Father       Review of patient's allergies  indicates:  No Known Allergies   Review of Systems   Constitutional:  Negative for activity change, appetite change, chills, fatigue and fever.   HENT:  Negative for sore throat.    Eyes:  Negative for visual disturbance.   Respiratory:  Negative for cough and shortness of breath.    Cardiovascular:  Negative for chest pain.   Gastrointestinal:  Negative for abdominal pain, constipation, diarrhea, nausea and vomiting.   Endocrine: Negative for polyuria.   Genitourinary:  Negative for dysuria.   Musculoskeletal:  Negative for back pain.   Integumentary:  Negative for rash.   Allergic/Immunologic: Negative for frequent infections.   Neurological:  Negative for weakness and headaches.   Hematological:  Negative for adenopathy. Does not bruise/bleed easily.       Objective:      Vitals:    01/23/23 1314   BP: (!) 150/85   Pulse: 63   Resp: 16   Temp: 97.6 °F (36.4 °C)       Physical Exam  Constitutional:       General: He is not in acute distress.     Appearance: Normal appearance. He is not ill-appearing.   HENT:      Head: Normocephalic and atraumatic.      Nose: Nose normal.      Mouth/Throat:      Mouth: Mucous membranes are moist.      Pharynx: Oropharynx is clear.   Eyes:      Extraocular Movements: Extraocular movements intact.      Conjunctiva/sclera: Conjunctivae normal.      Pupils: Pupils are equal, round, and reactive to light.   Cardiovascular:      Rate and Rhythm: Normal rate and regular rhythm.      Pulses: Normal pulses.      Heart sounds: Normal heart sounds. No murmur heard.  Pulmonary:      Effort: Pulmonary effort is normal. No respiratory distress.      Breath sounds: Normal breath sounds.   Abdominal:      General: There is no distension.      Palpations: Abdomen is soft.      Tenderness: There is no abdominal tenderness.   Musculoskeletal:         General: Normal range of motion.      Cervical back: Normal range of motion and neck supple.      Right lower leg: No edema.      Left lower leg: No  edema.   Lymphadenopathy:      Cervical: No cervical adenopathy.   Skin:     General: Skin is warm and dry.   Neurological:      General: No focal deficit present.      Mental Status: He is alert and oriented to person, place, and time.       LABS AND IMAGING REVIEWED IN EPIC    Component      Latest Ref Rng & Units 1/9/2023   Protime      12.5 - 14.5 seconds 13.0   INR      0.00 - 1.30 0.99     Component      Latest Ref Rng & Units 1/9/2023   aPTT      23.2 - 33.7 seconds 40.9 (H)       Assessment:   Prolonged PTT - Minimally prolonged with normal PT as noted above. Will check labs today for further evaluation into cause. Testing initially done for preop evaluation prior to pituitary tumor removal. Currently asymptomatic with no bleeding history.       Plan:       - Labs today  - Will call with results, follow up pending lab needs prior to surgery      Elizabeth Kennedy LeJeune, MD  Hematology/Oncology   [facility]

## 2023-01-24 DIAGNOSIS — R79.1 ELEVATED PARTIAL THROMBOPLASTIN TIME (PTT): Primary | ICD-10-CM

## 2023-01-24 LAB
APTT PPP: 27.9 SECONDS
APTT PPP: 33.3 SECONDS
APTT PPP: 36.4 SECONDS
APTT PPP: 39.7 SECONDS (ref 23.2–33.7)
APTT PPP: 40.3 SECONDS
APTT PPP: 51.1 SECONDS
DRVV CONF RATIO (OHS): 32.3
DRVV NORM RATIO (OHS): 1.38 (ref 0–1.19)
DRVV SCR RATIO (OHS): 44.7
FACT VIII ACT/NOR PPP: 112 % (ref 55–200)
L.A. PATH INTERP (OHS): ABNORMAL
LA ST CALC (OHS): 1.6 SECONDS (ref 0–7.9)
MIXING STUDIES PPP-IMP: ABNORMAL
PROTHROMBIN TIME: 12.5 SECONDS (ref 12.5–14.5)
PROTHROMBIN TIME: 13 SECONDS (ref 12.2–14.7)
PROTHROMBIN TIME: 14.1 SECONDS
PROTHROMBIN TIME: 14.6 SECONDS
PROTHROMBIN TIME: 15.1 SECONDS
PT BASELINE PNP (OHS): 12.6 SECONDS
PT PERCENT CORRECT (OHS): 125 %
PT ROSNER INDEX (OHS): 11.5
PTT PERCENT CORRECT (OHS): 54.2 %
PTT ROSNER INDEX (OHS): 31.2
VON WILLEBRAND EVAL PPP-IMP: NORMAL
VWF AG ACT/NOR PPP IA: 71 % (ref 55–200)
VWF:AC ACT/NOR PPP IA: 65 % (ref 55–200)

## 2023-01-25 ENCOUNTER — LAB VISIT (OUTPATIENT)
Dept: LAB | Facility: HOSPITAL | Age: 68
End: 2023-01-25
Attending: STUDENT IN AN ORGANIZED HEALTH CARE EDUCATION/TRAINING PROGRAM
Payer: MEDICARE

## 2023-01-25 DIAGNOSIS — R79.1 ELEVATED PARTIAL THROMBOPLASTIN TIME (PTT): ICD-10-CM

## 2023-01-25 LAB
APTT PPP: 39.7 SECONDS (ref 23.2–33.7)
FACT IX ACT/NOR PPP: 126 % (ref 59–191)
PROTHROMBIN TIME: 13 SECONDS (ref 12.2–14.7)

## 2023-01-25 PROCEDURE — 85280 CLOT FACTOR XII HAGEMAN: CPT

## 2023-01-25 PROCEDURE — 36415 COLL VENOUS BLD VENIPUNCTURE: CPT

## 2023-01-25 PROCEDURE — 85250 CLOT FACTOR IX PTC/CHRSTMAS: CPT

## 2023-01-25 PROCEDURE — 85270 CLOT FACTOR XI PTA: CPT

## 2023-01-26 LAB — MAYO GENERIC ORDERABLE RESULT: NORMAL

## 2023-02-02 ENCOUNTER — OFFICE VISIT (OUTPATIENT)
Dept: HEMATOLOGY/ONCOLOGY | Facility: CLINIC | Age: 68
End: 2023-02-02
Payer: MEDICARE

## 2023-02-02 VITALS
WEIGHT: 175 LBS | HEIGHT: 65 IN | SYSTOLIC BLOOD PRESSURE: 139 MMHG | DIASTOLIC BLOOD PRESSURE: 80 MMHG | OXYGEN SATURATION: 99 % | BODY MASS INDEX: 29.16 KG/M2 | HEART RATE: 64 BPM | TEMPERATURE: 98 F | RESPIRATION RATE: 18 BRPM

## 2023-02-02 DIAGNOSIS — D68.62 LUPUS ANTICOAGULANT INHIBITOR SYNDROME: ICD-10-CM

## 2023-02-02 DIAGNOSIS — R79.1 ELEVATED PARTIAL THROMBOPLASTIN TIME (PTT): Primary | ICD-10-CM

## 2023-02-02 PROCEDURE — 99214 OFFICE O/P EST MOD 30 MIN: CPT | Mod: PBBFAC | Performed by: STUDENT IN AN ORGANIZED HEALTH CARE EDUCATION/TRAINING PROGRAM

## 2023-02-02 PROCEDURE — 99213 OFFICE O/P EST LOW 20 MIN: CPT | Mod: S$PBB,,, | Performed by: STUDENT IN AN ORGANIZED HEALTH CARE EDUCATION/TRAINING PROGRAM

## 2023-02-02 PROCEDURE — 99213 PR OFFICE/OUTPT VISIT, EST, LEVL III, 20-29 MIN: ICD-10-PCS | Mod: S$PBB,,, | Performed by: STUDENT IN AN ORGANIZED HEALTH CARE EDUCATION/TRAINING PROGRAM

## 2023-02-02 PROCEDURE — 99999 PR PBB SHADOW E&M-EST. PATIENT-LVL IV: CPT | Mod: PBBFAC,,, | Performed by: STUDENT IN AN ORGANIZED HEALTH CARE EDUCATION/TRAINING PROGRAM

## 2023-02-02 PROCEDURE — 99999 PR PBB SHADOW E&M-EST. PATIENT-LVL IV: ICD-10-PCS | Mod: PBBFAC,,, | Performed by: STUDENT IN AN ORGANIZED HEALTH CARE EDUCATION/TRAINING PROGRAM

## 2023-02-02 NOTE — PROGRESS NOTES
Subjective:       Patient ID: Jacques Garcia is a 68 y.o. male.    Chief Complaint: Follow Up    Diagnosis:   Lupus Anticoagulant   PTT Prolongation due to above inhibitor    Current Treatment: None    Treatment History: N/A    HPI:  69yo M who presents for evaluation of prolonged PTT. Patient was planning to undergo surgery for resection of a nonfunctioning pituitary tumor in January 2022 when preoperative labs revealed coagulation studies of a normal PT and INR, however a prolonged PTT at 40 seconds. He was referred to hematology for further evaluation.     Today he has no complaints. He has never had any bleeding, bruising, or clotting disorders. He has never had surgery before. Denies any prolonged bleeding incidents after minor trauma or dental exams. No family history of hematolologic issues. He is in good health. Denies any unintentional weight loss. Denies any OTC medication use other than what is prescribed. No hepatic dysfunction he is aware of.    Interval History:  Patient presents today to discuss lab results. I discussed his findings and the reason for his prolonged PTT. He again confirmed he has never had a thrombosis of any kind. We discussed my recommendations, his daughter was listening on the phone as well. All questions answered. He has no new complaints today.     Past Medical History:   Diagnosis Date    Benign neoplasm of pituitary gland     BPH (benign prostatic hyperplasia)     Cervical disc disorder     DM (diabetes mellitus)     Heartburn     HLD (hyperlipidemia)     HTN (hypertension)     Insomnia     Kidney stones     Prostate cancer 2016    treated with radiation    SOB (shortness of breath)     Spondylosis of cervical region without myelopathy or radiculopathy       Past Surgical History:   Procedure Laterality Date    CATARACT EXTRACTION Bilateral     COLONOSCOPY      MULTIPLE TOOTH EXTRACTIONS      neck cyst       Social History     Socioeconomic History    Marital status:     Tobacco Use    Smoking status: Never    Smokeless tobacco: Never   Substance and Sexual Activity    Alcohol use: Not Currently    Drug use: Never    Sexual activity: Not Currently      Family History   Problem Relation Age of Onset    Diabetes Mother     Colon cancer Mother     Hypertension Father     Diabetes Father       Review of patient's allergies indicates:  No Known Allergies   Review of Systems   Constitutional:  Negative for activity change, appetite change, chills, fatigue and fever.   HENT:  Negative for sore throat.    Eyes:  Negative for visual disturbance.   Respiratory:  Negative for cough and shortness of breath.    Cardiovascular:  Negative for chest pain.   Gastrointestinal:  Negative for abdominal pain, constipation, diarrhea, nausea and vomiting.   Endocrine: Negative for polyuria.   Genitourinary:  Negative for dysuria.   Musculoskeletal:  Negative for back pain.   Integumentary:  Negative for rash.   Allergic/Immunologic: Negative for frequent infections.   Neurological:  Negative for weakness and headaches.   Hematological:  Negative for adenopathy. Does not bruise/bleed easily.       Objective:      Vitals:    02/02/23 1050   BP: 139/80   Pulse: 64   Resp: 18   Temp: 97.5 °F (36.4 °C)       Physical Exam  Constitutional:       General: He is not in acute distress.     Appearance: Normal appearance. He is not ill-appearing.   HENT:      Head: Normocephalic and atraumatic.      Nose: Nose normal.      Mouth/Throat:      Mouth: Mucous membranes are moist.      Pharynx: Oropharynx is clear.   Eyes:      Extraocular Movements: Extraocular movements intact.      Conjunctiva/sclera: Conjunctivae normal.      Pupils: Pupils are equal, round, and reactive to light.   Cardiovascular:      Rate and Rhythm: Normal rate and regular rhythm.      Pulses: Normal pulses.      Heart sounds: Normal heart sounds. No murmur heard.  Pulmonary:      Effort: Pulmonary effort is normal. No respiratory distress.       Breath sounds: Normal breath sounds.   Abdominal:      General: There is no distension.      Palpations: Abdomen is soft.      Tenderness: There is no abdominal tenderness.   Musculoskeletal:         General: Normal range of motion.      Cervical back: Normal range of motion and neck supple.      Right lower leg: No edema.      Left lower leg: No edema.   Lymphadenopathy:      Cervical: No cervical adenopathy.   Skin:     General: Skin is warm and dry.   Neurological:      General: No focal deficit present.      Mental Status: He is alert and oriented to person, place, and time.       LABS AND IMAGING REVIEWED IN EPIC    Component      Latest Ref Rng & Units 1/9/2023   Protime      12.5 - 14.5 seconds 13.0   INR      0.00 - 1.30 0.99     Component      Latest Ref Rng & Units 1/9/2023   aPTT      23.2 - 33.7 seconds 40.9 (H)     Component      Latest Ref Rng & Units 1/23/2023           1:46 PM   PTT Base Patient      23.2 - 33.7 seconds 39.7 (H)   PTT Base PNP      seconds 27.9   PTT 1:1 Initial      seconds 33.3   PTT Incub Patient      seconds 51.1   PTT Incub PNP      seconds 36.4   PTT 1:1 Together 60      seconds 40.3   PTT Joan Index       31.2   PTT Percent Correct      % 54.2   Mix Interp       MIXING STUDY:  The mixing study findings are suggestive of a factor inhibitor. . . .     Component      Latest Ref Rng & Units 1/25/2023   Factor IX      59 - 191 % 126   Factor 11 and 12 Activity Normal    Component      Latest Ref Rng & Units 1/23/2023   DRVV Scr Ratio       44.70   DRVV Conf Ratio       32.30   DRVV Norm Ratio      0.00 - 1.19 1.38 (H)   Lupus Anticoag ST Calc      0.0 - 7.9 seconds 1.6   Interpretative Report for Lupus Anticoagulant Interpretation:  Lupus anticoagulant present.   Assessment:   Lupus Anticoagulant - No history of thrombosis, this was discovered with preoperative labs showing prolonged PTT in January '22. Mixing study without evidence of correction indicating presence of  inhibitor. Lupus anticoagulant positive. No evidence of other Factor deficiency or inhibitor presence. Will this he is at not at increased risk of bleeding, but more so increased risk of thrombosis. For this reason I recommend he start anticoagulation, preferably with LMWH as soon as possible after surgery and continue until he is fully ambulatory. Given he has no history of thrombosis there is no role for chronic anticoagulation at this time. I will discuss these finding with Dr. Vasquez, the patient, and sent my recommendations to Dr. Vasquez's office.       Plan:       - Will repeat lupus anticoagulant labs in 12 weeks  - RTC in 3 months to discuss lab results and see how he did with surgery  - Always available to discuss case further should it be needed      Elizabeth Kennedy LeJeune, MD  Hematology/Oncology   [facility]

## 2023-02-14 ENCOUNTER — TELEPHONE (OUTPATIENT)
Dept: NEUROSURGERY | Facility: CLINIC | Age: 68
End: 2023-02-14
Payer: MEDICARE

## 2023-02-14 NOTE — TELEPHONE ENCOUNTER
Spoke with patient. I let him know I am working on getting him rescheduled for surgery. He voiced understanding, he is not having any issues.

## 2023-02-16 LAB — FACT XII ACT/NOR PPP: NORMAL %

## 2023-02-27 ENCOUNTER — TELEPHONE (OUTPATIENT)
Dept: NEUROSURGERY | Facility: CLINIC | Age: 68
End: 2023-02-27
Payer: MEDICARE

## 2023-02-27 NOTE — TELEPHONE ENCOUNTER
----- Message from Jassi Vasquez MD sent at 2/3/2023 12:22 PM CST -----  Regarding: Surgery  Please reschedule his surgery. He was cleared by Dr. Lejeune from [facility]. Needs lovenox started POD#1

## 2023-03-13 ENCOUNTER — OFFICE VISIT (OUTPATIENT)
Dept: NEUROSURGERY | Facility: CLINIC | Age: 68
End: 2023-03-13
Payer: MEDICARE

## 2023-03-13 VITALS
HEART RATE: 59 BPM | RESPIRATION RATE: 18 BRPM | WEIGHT: 177 LBS | DIASTOLIC BLOOD PRESSURE: 72 MMHG | SYSTOLIC BLOOD PRESSURE: 132 MMHG | HEIGHT: 65 IN | BODY MASS INDEX: 29.49 KG/M2 | TEMPERATURE: 99 F

## 2023-03-13 DIAGNOSIS — D35.2 BENIGN NEOPLASM OF PITUITARY GLAND: Primary | ICD-10-CM

## 2023-03-13 DIAGNOSIS — D68.9 COAGULATION DEFECT: ICD-10-CM

## 2023-03-13 DIAGNOSIS — E11.9 TYPE 2 DIABETES MELLITUS WITHOUT COMPLICATION, UNSPECIFIED WHETHER LONG TERM INSULIN USE: ICD-10-CM

## 2023-03-13 PROCEDURE — 99213 OFFICE O/P EST LOW 20 MIN: CPT | Mod: ,,, | Performed by: PHYSICIAN ASSISTANT

## 2023-03-13 PROCEDURE — 99213 PR OFFICE/OUTPT VISIT, EST, LEVL III, 20-29 MIN: ICD-10-PCS | Mod: ,,, | Performed by: PHYSICIAN ASSISTANT

## 2023-03-13 RX ORDER — NEOMYCIN SULFATE, POLYMYXIN B SULFATE AND DEXAMETHASONE 3.5; 10000; 1 MG/ML; [USP'U]/ML; MG/ML
SUSPENSION/ DROPS OPHTHALMIC DAILY
COMMUNITY

## 2023-03-13 NOTE — H&P (VIEW-ONLY)
"[facility]  History & Physical  Neurosurgery      Jacques Garcia   [id number]   1955     SUBJECTIVE:     The patient presents today for pre-operative appointment. Mr. Garcia is a 67 y.o. male with incidental finding of pituitary tumor during workup for TIA in 2019.   At the time, he experienced transient blurred vision followed by numbness in the left side of his face and tongue.  He has followed the pituitary lesion with serial imaging with his family physician.  Pituitary MRI with and without contrast on 5/25/2022 revealed an increase in size of the right sided lesion.  There was also "increasingly evident smaller hypo-enhancing 4 mm mass in the left lobe of the pituitary".  He was then referred to our office for neurosurgical evaluation and care.  He returned to the office in November 2022 with repeat MRI pituitary which revealed stable appearance of pituitary gland and lesions.  Plans were made for surgery.  Preoperative blood work revealed prolonged PTT.  Surgery was postponed for workup.      He was seen by Dr. Elizabeth Lejeune.  The patient was found to have Lupus anticoagulant.  The PTT prolongation is due to this.  Being he is at risk for thrombosis, she recommends utilizing LMWH as soon as possible after surgery until he is fully ambulatory.    He remains asymptomatic from pituitary lesions. He denies any visual disturbances or headaches.  He was evaluated by Dr. Lavonne Curtis on 7/26/2022.  Visual field testing was intact. He has been evaluated by Dr. Lopez and tumor is nonfunctioning. He denies any changes in bowel or bladder function.  He is ready to proceed with surgery      Past Medical History:   Diagnosis Date    Benign neoplasm of pituitary gland     BPH (benign prostatic hyperplasia)     Cervical disc disorder     DM (diabetes mellitus)     Heartburn     HLD (hyperlipidemia)     HTN (hypertension)     Insomnia     Kidney stones     Prostate cancer 2016    treated with " radiation    SOB (shortness of breath)     Spondylosis of cervical region without myelopathy or radiculopathy         Past Surgical History:   Procedure Laterality Date    CATARACT EXTRACTION Bilateral     COLONOSCOPY      MULTIPLE TOOTH EXTRACTIONS      neck cyst          Social History     Tobacco Use    Smoking status: Never    Smokeless tobacco: Never   Substance Use Topics    Alcohol use: Not Currently        Family History   Problem Relation Age of Onset    Diabetes Mother     Colon cancer Mother     Hypertension Father     Diabetes Father         Review of patient's allergies indicates:  No Known Allergies     Medication List with Changes/Refills   Current Medications    ALBUTEROL (PROVENTIL/VENTOLIN HFA) 90 MCG/ACTUATION INHALER    as needed.    FLUTICASONE PROPIONATE (FLOVENT DISKUS) 50 MCG/ACTUATION DSDV    Inhale into the lungs 2 (two) times a day. Controller    LEVOCETIRIZINE (XYZAL) 5 MG TABLET    Take 5 mg by mouth every evening.    LINACLOTIDE (LINZESS) 72 MCG CAP CAPSULE    as needed.    LOSARTAN (COZAAR) 50 MG TABLET    once daily.    METFORMIN (GLUCOPHAGE) 500 MG TABLET    Take 500 mg by mouth 2 (two) times daily with meals.    MONTELUKAST (SINGULAIR) 10 MG TABLET    Take 10 mg by mouth every evening.    NEOMYCIN-POLYMYXIN-DEXAMETHASONE (MAXITROL) 3.5MG/ML-10,000 UNIT/ML-0.1 % DRPS    once daily.    OXYCODONE-ACETAMINOPHEN (PERCOCET)  MG PER TABLET    every 12 (twelve) hours as needed.    ROSUVASTATIN (CRESTOR) 20 MG TABLET    Take 20 mg by mouth once daily.    TADALAFIL (CIALIS) 20 MG TAB    Take 20 mg by mouth once daily.    TAMSULOSIN (FLOMAX) 0.4 MG CAP    Take 1 capsule by mouth every evening.        ROS:    Review of Systems   Constitutional:  Negative for chills and fever.   HENT:  Negative for nosebleeds and sore throat.    Eyes:  Negative for pain and visual disturbance.   Respiratory:  Negative for cough, chest tightness and shortness of breath.    Cardiovascular:  Negative for  "chest pain.   Gastrointestinal:  Negative for diarrhea, nausea and vomiting.   Genitourinary:  Negative for difficulty urinating, dysuria and hematuria.   Musculoskeletal:  Negative for gait problem and myalgias.   Skin:  Negative for rash.   Neurological:  Negative for dizziness, facial asymmetry and headaches.   Psychiatric/Behavioral:  Negative for confusion and sleep disturbance. The patient is not nervous/anxious.        OBJECTIVE:     Vital Signs:  /72 (BP Location: Other (Comment), Patient Position: Sitting)   Pulse (!) 59   Temp 98.7 °F (37.1 °C)   Resp 18   Ht 5' 5" (1.651 m)   Wt 80.3 kg (177 lb)   BMI 29.45 kg/m²      General:  healthy, alert, no distress     Head:  Normocephalic, without obvious abnormality, atraumatic    CV:  Neck is supple.  There are no carotid bruits.  Heart has regular rate and rhythm    Lungs:   Clear to ascultation bilaterally    Abdomen:  Soft, non-tender, non-distended    Neurological:    Oriented to Person, Place, Time   Speech: normal  Memory, cognition, and affect are appropriate.  Pupils are equal, round, and reactive to light,  Extraocular movements are intact.  Movements of facial expression are intact and symmetric.  Examination reveals 5/5 strength throughout upper and lower extremities with normal tone and bulk.  Sensation is intact.  Gait is normal.      Imaging:      ASSESSMENT/PLAN:     1. Benign neoplasm of pituitary gland  APTT    Protime-INR    Comprehensive Metabolic Panel    CBC Auto Differential      2. Type 2 diabetes mellitus without complication, unspecified whether long term insulin use  Comprehensive Metabolic Panel      3. Coagulation defect  APTT    Protime-INR        The nature of the procedure, as well as its attendant risks, were discussed in detail with the patient.  All of his questions were answered.  Plan is for transnasal transsphenoidal pituitary tumor resection tentatively scheduled for 3/28/2023.          Nataly Hurtado, " SAVANNAH

## 2023-03-13 NOTE — PROGRESS NOTES
"[facility]  History & Physical  Neurosurgery      Jacques Garcia   [id number]   1955     SUBJECTIVE:     The patient presents today for pre-operative appointment. Mr. Garcia is a 67 y.o. male with incidental finding of pituitary tumor during workup for TIA in 2019.   At the time, he experienced transient blurred vision followed by numbness in the left side of his face and tongue.  He has followed the pituitary lesion with serial imaging with his family physician.  Pituitary MRI with and without contrast on 5/25/2022 revealed an increase in size of the right sided lesion.  There was also "increasingly evident smaller hypo-enhancing 4 mm mass in the left lobe of the pituitary".  He was then referred to our office for neurosurgical evaluation and care.  He returned to the office in November 2022 with repeat MRI pituitary which revealed stable appearance of pituitary gland and lesions.  Plans were made for surgery.  Preoperative blood work revealed prolonged PTT.  Surgery was postponed for workup.      He was seen by Dr. Elizabeth Lejeune.  The patient was found to have Lupus anticoagulant.  The PTT prolongation is due to this.  Being he is at risk for thrombosis, she recommends utilizing LMWH as soon as possible after surgery until he is fully ambulatory.    He remains asymptomatic from pituitary lesions. He denies any visual disturbances or headaches.  He was evaluated by Dr. Lavonne Curtis on 7/26/2022.  Visual field testing was intact. He has been evaluated by Dr. Lopez and tumor is nonfunctioning. He denies any changes in bowel or bladder function.  He is ready to proceed with surgery      Past Medical History:   Diagnosis Date    Benign neoplasm of pituitary gland     BPH (benign prostatic hyperplasia)     Cervical disc disorder     DM (diabetes mellitus)     Heartburn     HLD (hyperlipidemia)     HTN (hypertension)     Insomnia     Kidney stones     Prostate cancer 2016    treated with " radiation    SOB (shortness of breath)     Spondylosis of cervical region without myelopathy or radiculopathy         Past Surgical History:   Procedure Laterality Date    CATARACT EXTRACTION Bilateral     COLONOSCOPY      MULTIPLE TOOTH EXTRACTIONS      neck cyst          Social History     Tobacco Use    Smoking status: Never    Smokeless tobacco: Never   Substance Use Topics    Alcohol use: Not Currently        Family History   Problem Relation Age of Onset    Diabetes Mother     Colon cancer Mother     Hypertension Father     Diabetes Father         Review of patient's allergies indicates:  No Known Allergies     Medication List with Changes/Refills   Current Medications    ALBUTEROL (PROVENTIL/VENTOLIN HFA) 90 MCG/ACTUATION INHALER    as needed.    FLUTICASONE PROPIONATE (FLOVENT DISKUS) 50 MCG/ACTUATION DSDV    Inhale into the lungs 2 (two) times a day. Controller    LEVOCETIRIZINE (XYZAL) 5 MG TABLET    Take 5 mg by mouth every evening.    LINACLOTIDE (LINZESS) 72 MCG CAP CAPSULE    as needed.    LOSARTAN (COZAAR) 50 MG TABLET    once daily.    METFORMIN (GLUCOPHAGE) 500 MG TABLET    Take 500 mg by mouth 2 (two) times daily with meals.    MONTELUKAST (SINGULAIR) 10 MG TABLET    Take 10 mg by mouth every evening.    NEOMYCIN-POLYMYXIN-DEXAMETHASONE (MAXITROL) 3.5MG/ML-10,000 UNIT/ML-0.1 % DRPS    once daily.    OXYCODONE-ACETAMINOPHEN (PERCOCET)  MG PER TABLET    every 12 (twelve) hours as needed.    ROSUVASTATIN (CRESTOR) 20 MG TABLET    Take 20 mg by mouth once daily.    TADALAFIL (CIALIS) 20 MG TAB    Take 20 mg by mouth once daily.    TAMSULOSIN (FLOMAX) 0.4 MG CAP    Take 1 capsule by mouth every evening.        ROS:    Review of Systems   Constitutional:  Negative for chills and fever.   HENT:  Negative for nosebleeds and sore throat.    Eyes:  Negative for pain and visual disturbance.   Respiratory:  Negative for cough, chest tightness and shortness of breath.    Cardiovascular:  Negative for  "chest pain.   Gastrointestinal:  Negative for diarrhea, nausea and vomiting.   Genitourinary:  Negative for difficulty urinating, dysuria and hematuria.   Musculoskeletal:  Negative for gait problem and myalgias.   Skin:  Negative for rash.   Neurological:  Negative for dizziness, facial asymmetry and headaches.   Psychiatric/Behavioral:  Negative for confusion and sleep disturbance. The patient is not nervous/anxious.        OBJECTIVE:     Vital Signs:  /72 (BP Location: Other (Comment), Patient Position: Sitting)   Pulse (!) 59   Temp 98.7 °F (37.1 °C)   Resp 18   Ht 5' 5" (1.651 m)   Wt 80.3 kg (177 lb)   BMI 29.45 kg/m²      General:  healthy, alert, no distress     Head:  Normocephalic, without obvious abnormality, atraumatic    CV:  Neck is supple.  There are no carotid bruits.  Heart has regular rate and rhythm    Lungs:   Clear to ascultation bilaterally    Abdomen:  Soft, non-tender, non-distended    Neurological:    Oriented to Person, Place, Time   Speech: normal  Memory, cognition, and affect are appropriate.  Pupils are equal, round, and reactive to light,  Extraocular movements are intact.  Movements of facial expression are intact and symmetric.  Examination reveals 5/5 strength throughout upper and lower extremities with normal tone and bulk.  Sensation is intact.  Gait is normal.      Imaging:      ASSESSMENT/PLAN:     1. Benign neoplasm of pituitary gland  APTT    Protime-INR    Comprehensive Metabolic Panel    CBC Auto Differential      2. Type 2 diabetes mellitus without complication, unspecified whether long term insulin use  Comprehensive Metabolic Panel      3. Coagulation defect  APTT    Protime-INR        The nature of the procedure, as well as its attendant risks, were discussed in detail with the patient.  All of his questions were answered.  Plan is for transnasal transsphenoidal pituitary tumor resection tentatively scheduled for 3/28/2023.          Nataly Hurtado, " SAVANNAH

## 2023-03-16 RX ORDER — DIPHENHYDRAMINE HCL 25 MG
25 TABLET ORAL NIGHTLY PRN
COMMUNITY

## 2023-03-20 ENCOUNTER — LAB VISIT (OUTPATIENT)
Dept: LAB | Facility: HOSPITAL | Age: 68
End: 2023-03-20
Attending: PHYSICIAN ASSISTANT
Payer: MEDICARE

## 2023-03-20 DIAGNOSIS — E11.9 TYPE 2 DIABETES MELLITUS WITHOUT COMPLICATION, UNSPECIFIED WHETHER LONG TERM INSULIN USE: ICD-10-CM

## 2023-03-20 DIAGNOSIS — D68.9 COAGULATION DEFECT: ICD-10-CM

## 2023-03-20 DIAGNOSIS — D35.2 BENIGN NEOPLASM OF PITUITARY GLAND: ICD-10-CM

## 2023-03-20 LAB
ALBUMIN SERPL-MCNC: 4 G/DL (ref 3.4–4.8)
ALBUMIN/GLOB SERPL: 1.4 RATIO (ref 1.1–2)
ALP SERPL-CCNC: 67 UNIT/L (ref 40–150)
ALT SERPL-CCNC: 14 UNIT/L (ref 0–55)
APTT PPP: 40.7 SECONDS (ref 23.2–33.7)
AST SERPL-CCNC: 14 UNIT/L (ref 5–34)
BASOPHILS # BLD AUTO: 0.05 X10(3)/MCL (ref 0–0.2)
BASOPHILS NFR BLD AUTO: 0.9 %
BILIRUBIN DIRECT+TOT PNL SERPL-MCNC: 0.3 MG/DL
BUN SERPL-MCNC: 15 MG/DL (ref 8.4–25.7)
CALCIUM SERPL-MCNC: 9.6 MG/DL (ref 8.8–10)
CHLORIDE SERPL-SCNC: 106 MMOL/L (ref 98–107)
CO2 SERPL-SCNC: 22 MMOL/L (ref 23–31)
CREAT SERPL-MCNC: 0.8 MG/DL (ref 0.73–1.18)
EOSINOPHIL # BLD AUTO: 0.38 X10(3)/MCL (ref 0–0.9)
EOSINOPHIL NFR BLD AUTO: 6.5 %
ERYTHROCYTE [DISTWIDTH] IN BLOOD BY AUTOMATED COUNT: 14.6 % (ref 11.5–17)
GFR SERPLBLD CREATININE-BSD FMLA CKD-EPI: >60 MLS/MIN/1.73/M2
GLOBULIN SER-MCNC: 2.8 GM/DL (ref 2.4–3.5)
GLUCOSE SERPL-MCNC: 97 MG/DL (ref 82–115)
HCT VFR BLD AUTO: 44.1 % (ref 42–52)
HGB BLD-MCNC: 13.5 G/DL (ref 14–18)
IMM GRANULOCYTES # BLD AUTO: 0.02 X10(3)/MCL (ref 0–0.04)
IMM GRANULOCYTES NFR BLD AUTO: 0.3 %
INR BLD: 0.99 (ref 0–1.3)
LYMPHOCYTES # BLD AUTO: 1.39 X10(3)/MCL (ref 0.6–4.6)
LYMPHOCYTES NFR BLD AUTO: 23.9 %
MCH RBC QN AUTO: 26.2 PG
MCHC RBC AUTO-ENTMCNC: 30.6 G/DL (ref 33–36)
MCV RBC AUTO: 85.5 FL (ref 80–94)
MONOCYTES # BLD AUTO: 0.57 X10(3)/MCL (ref 0.1–1.3)
MONOCYTES NFR BLD AUTO: 9.8 %
NEUTROPHILS # BLD AUTO: 3.4 X10(3)/MCL (ref 2.1–9.2)
NEUTROPHILS NFR BLD AUTO: 58.6 %
NRBC BLD AUTO-RTO: 0 %
PLATELET # BLD AUTO: 299 X10(3)/MCL (ref 130–400)
PMV BLD AUTO: 10.4 FL (ref 7.4–10.4)
POTASSIUM SERPL-SCNC: 4.5 MMOL/L (ref 3.5–5.1)
PROT SERPL-MCNC: 6.8 GM/DL (ref 5.8–7.6)
PROTHROMBIN TIME: 13 SECONDS (ref 12.5–14.5)
RBC # BLD AUTO: 5.16 X10(6)/MCL (ref 4.7–6.1)
SODIUM SERPL-SCNC: 139 MMOL/L (ref 136–145)
WBC # SPEC AUTO: 5.8 X10(3)/MCL (ref 4.5–11.5)

## 2023-03-20 PROCEDURE — 85610 PROTHROMBIN TIME: CPT

## 2023-03-20 PROCEDURE — 85025 COMPLETE CBC W/AUTO DIFF WBC: CPT

## 2023-03-20 PROCEDURE — 80053 COMPREHEN METABOLIC PANEL: CPT

## 2023-03-20 PROCEDURE — 85730 THROMBOPLASTIN TIME PARTIAL: CPT

## 2023-03-20 PROCEDURE — 36415 COLL VENOUS BLD VENIPUNCTURE: CPT

## 2023-03-22 ENCOUNTER — ANESTHESIA EVENT (OUTPATIENT)
Dept: SURGERY | Facility: HOSPITAL | Age: 68
DRG: 614 | End: 2023-03-22
Payer: MEDICARE

## 2023-03-27 ENCOUNTER — TELEPHONE (OUTPATIENT)
Dept: NEUROSURGERY | Facility: CLINIC | Age: 68
End: 2023-03-27
Payer: MEDICARE

## 2023-03-27 DIAGNOSIS — D35.2 PITUITARY ADENOMA: ICD-10-CM

## 2023-03-27 DIAGNOSIS — D35.2 BENIGN NEOPLASM OF PITUITARY GLAND: Primary | ICD-10-CM

## 2023-03-27 RX ORDER — MUPIROCIN 20 MG/G
1 OINTMENT TOPICAL 2 TIMES DAILY
Status: CANCELLED | OUTPATIENT
Start: 2023-03-27 | End: 2023-03-28

## 2023-03-27 NOTE — PROGRESS NOTES
[facility]: Outpatient Surgery  Preprocedure Check-In Instructions    Your arrival time for your surgery or procedure is 5:00am.    We ask patients to arrive about 2 hours before surgery to allow for enough time to review your health history & medications, start your IV, complete any outstanding labwork or tests, and meet your Anesthesiologist.  You will arrive at [facility], [address] [place] [address].  Enter through the [place] entrance next to the Emergency Room, and come to the 6th floor to the Outpatient Surgery Department.    Visitory Policy:  You are allowed 2 adult visitors to be with you in the hospital.  All hospital visitors should be in good current health.  No small children.    What to Bring:  Please have your ID, insurance cards, and all home medication bottles with you at check in.  Bring your CPAP machine if one is used at home.    Fasting:  Nothing to eat or drink after midnight the night before your procedure. This includes no ice, gum, hard candies, and/or tobacco products.  Follow your doctor's instructions for taking any medications on the morning of your procedure.  If no instructions for taking medications were given, do not take any medications but bring your medications in their bottles to your procedure check in.    Follow your doctor's preoperative instructions regarding skin prep, bowel prep, bathing, or showering prior to your procedure.  If any special soaps were provided to you, please use according to your doctor's instructions. If no instructions were given from your doctor, take a good bath or shower with antibacterial soap the night before and the morning of your procedure.  On the morning of procedure, wear loose, comfortable clothing.  No lotions, makeup, perfumes, colognes, deodorant, or jewelry to your procedure.  Removable items (glasses, contact lenses, dentures, retainers, hearing aids) need to be removed for your procedure.   Bring your storage containers for these items if you wear them.    Artificial nails, body jewelry, eyelash extensions, and/or hair extensions with metal clips are not allowed during your surgery.  If you currently wear any of these items, please arrange for them to be removed prior to your arrival to the hospital.    Outpatient or Same Day Surgeries:  Any patients receiving sedation/anesthesia are advised not to drive for 24 hours after their procedure.  We do not allow patients to drive themselves home after discharge.  If you are going home after your procedure, please have someone available to drive you home from the hospital.      You may call the Outpatient Surgery Department at [phone] with any questions or concerns.  We are looking forward to meeting you and taking great care of you for your procedure.  Thank you for choosing [facility] for your surgical needs.        Status:complete  Spoke with: patient  Time:  9156

## 2023-03-28 ENCOUNTER — HOSPITAL ENCOUNTER (INPATIENT)
Facility: HOSPITAL | Age: 68
LOS: 1 days | Discharge: HOME | DRG: 614 | End: 2023-03-29
Attending: NEUROLOGICAL SURGERY | Admitting: NEUROLOGICAL SURGERY
Payer: MEDICARE

## 2023-03-28 ENCOUNTER — ANESTHESIA (OUTPATIENT)
Dept: SURGERY | Facility: HOSPITAL | Age: 68
DRG: 614 | End: 2023-03-28
Payer: MEDICARE

## 2023-03-28 DIAGNOSIS — D35.2 BENIGN NEOPLASM OF PITUITARY GLAND: ICD-10-CM

## 2023-03-28 DIAGNOSIS — D35.2 PITUITARY ADENOMA: ICD-10-CM

## 2023-03-28 LAB
GROUP & RH: NORMAL
INDIRECT COOMBS GEL: NORMAL
POCT GLUCOSE: 173 MG/DL (ref 70–110)
POCT GLUCOSE: 185 MG/DL (ref 70–110)
POCT GLUCOSE: 92 MG/DL (ref 70–110)
SPECIMEN OUTDATE: NORMAL

## 2023-03-28 PROCEDURE — 71000033 HC RECOVERY, INTIAL HOUR: Performed by: NEUROLOGICAL SURGERY

## 2023-03-28 PROCEDURE — 36000712 HC OR TIME LEV V 1ST 15 MIN: Performed by: NEUROLOGICAL SURGERY

## 2023-03-28 PROCEDURE — 36000713 HC OR TIME LEV V EA ADD 15 MIN: Performed by: NEUROLOGICAL SURGERY

## 2023-03-28 PROCEDURE — 36620 INSERTION CATHETER ARTERY: CPT

## 2023-03-28 PROCEDURE — 86900 BLOOD TYPING SEROLOGIC ABO: CPT | Performed by: NEUROLOGICAL SURGERY

## 2023-03-28 PROCEDURE — 71000015 HC POSTOP RECOV 1ST HR: Performed by: NEUROLOGICAL SURGERY

## 2023-03-28 PROCEDURE — 25000003 PHARM REV CODE 250: Performed by: NURSE ANESTHETIST, CERTIFIED REGISTERED

## 2023-03-28 PROCEDURE — 76937 US GUIDE VASCULAR ACCESS: CPT

## 2023-03-28 PROCEDURE — 37000008 HC ANESTHESIA 1ST 15 MINUTES: Performed by: NEUROLOGICAL SURGERY

## 2023-03-28 PROCEDURE — 63600175 PHARM REV CODE 636 W HCPCS: Performed by: ANESTHESIOLOGY

## 2023-03-28 PROCEDURE — 88305 TISSUE EXAM BY PATHOLOGIST: CPT | Performed by: NEUROLOGICAL SURGERY

## 2023-03-28 PROCEDURE — 27201423 OPTIME MED/SURG SUP & DEVICES STERILE SUPPLY: Performed by: NEUROLOGICAL SURGERY

## 2023-03-28 PROCEDURE — 62165 PR NEUROENDOSCOP,EXC,PIT TUM,TRANSNAS/SPHEN: ICD-10-PCS | Mod: 62,,, | Performed by: NEUROLOGICAL SURGERY

## 2023-03-28 PROCEDURE — 63600175 PHARM REV CODE 636 W HCPCS: Performed by: NEUROLOGICAL SURGERY

## 2023-03-28 PROCEDURE — 21400001 HC TELEMETRY ROOM

## 2023-03-28 PROCEDURE — 88342 IMHCHEM/IMCYTCHM 1ST ANTB: CPT

## 2023-03-28 PROCEDURE — 63600175 PHARM REV CODE 636 W HCPCS: Performed by: NURSE ANESTHETIST, CERTIFIED REGISTERED

## 2023-03-28 PROCEDURE — 11000001 HC ACUTE MED/SURG PRIVATE ROOM

## 2023-03-28 PROCEDURE — 63600175 PHARM REV CODE 636 W HCPCS

## 2023-03-28 PROCEDURE — 37000009 HC ANESTHESIA EA ADD 15 MINS: Performed by: NEUROLOGICAL SURGERY

## 2023-03-28 PROCEDURE — 62165 REMOVE PITUIT TUMOR W/SCOPE: CPT | Mod: 62,,, | Performed by: NEUROLOGICAL SURGERY

## 2023-03-28 PROCEDURE — 88313 SPECIAL STAINS GROUP 2: CPT

## 2023-03-28 PROCEDURE — 25000003 PHARM REV CODE 250: Performed by: NEUROLOGICAL SURGERY

## 2023-03-28 PROCEDURE — 61781 PR STEREOTACTIC COMP ASSIST PROC,CRANIAL,INTRADURAL: ICD-10-PCS | Mod: ,,, | Performed by: NEUROLOGICAL SURGERY

## 2023-03-28 PROCEDURE — 88331 PATH CONSLTJ SURG 1 BLK 1SPC: CPT

## 2023-03-28 PROCEDURE — 25000003 PHARM REV CODE 250: Performed by: ANESTHESIOLOGY

## 2023-03-28 PROCEDURE — 61781 SCAN PROC CRANIAL INTRA: CPT | Mod: ,,, | Performed by: NEUROLOGICAL SURGERY

## 2023-03-28 RX ORDER — PROCHLORPERAZINE EDISYLATE 5 MG/ML
5 INJECTION INTRAMUSCULAR; INTRAVENOUS EVERY 30 MIN PRN
Status: DISCONTINUED | OUTPATIENT
Start: 2023-03-28 | End: 2023-03-28 | Stop reason: HOSPADM

## 2023-03-28 RX ORDER — MIDAZOLAM HYDROCHLORIDE 1 MG/ML
INJECTION INTRAMUSCULAR; INTRAVENOUS
Status: DISCONTINUED | OUTPATIENT
Start: 2023-03-28 | End: 2023-03-28

## 2023-03-28 RX ORDER — HYDROMORPHONE HYDROCHLORIDE 2 MG/ML
0.2 INJECTION, SOLUTION INTRAMUSCULAR; INTRAVENOUS; SUBCUTANEOUS EVERY 5 MIN PRN
Status: DISCONTINUED | OUTPATIENT
Start: 2023-03-28 | End: 2023-03-28

## 2023-03-28 RX ORDER — PROPOFOL 10 MG/ML
VIAL (ML) INTRAVENOUS
Status: DISCONTINUED | OUTPATIENT
Start: 2023-03-28 | End: 2023-03-28

## 2023-03-28 RX ORDER — CEFAZOLIN SODIUM 2 G/50ML
2 SOLUTION INTRAVENOUS
Status: COMPLETED | OUTPATIENT
Start: 2023-03-28 | End: 2023-03-28

## 2023-03-28 RX ORDER — DIPHENHYDRAMINE HCL 25 MG
25 CAPSULE ORAL NIGHTLY PRN
Status: DISCONTINUED | OUTPATIENT
Start: 2023-03-28 | End: 2023-03-29 | Stop reason: HOSPADM

## 2023-03-28 RX ORDER — KETOROLAC TROMETHAMINE 30 MG/ML
30 INJECTION, SOLUTION INTRAMUSCULAR; INTRAVENOUS ONCE
Status: COMPLETED | OUTPATIENT
Start: 2023-03-28 | End: 2023-03-28

## 2023-03-28 RX ORDER — ONDANSETRON 4 MG/1
8 TABLET, ORALLY DISINTEGRATING ORAL EVERY 8 HOURS PRN
Status: DISCONTINUED | OUTPATIENT
Start: 2023-03-28 | End: 2023-03-29 | Stop reason: HOSPADM

## 2023-03-28 RX ORDER — MIDAZOLAM HYDROCHLORIDE 1 MG/ML
2 INJECTION INTRAMUSCULAR; INTRAVENOUS
Status: DISCONTINUED | OUTPATIENT
Start: 2023-03-29 | End: 2023-03-28 | Stop reason: HOSPADM

## 2023-03-28 RX ORDER — MONTELUKAST SODIUM 10 MG/1
10 TABLET ORAL NIGHTLY
Status: DISCONTINUED | OUTPATIENT
Start: 2023-03-28 | End: 2023-03-29 | Stop reason: HOSPADM

## 2023-03-28 RX ORDER — SODIUM CITRATE AND CITRIC ACID MONOHYDRATE 334; 500 MG/5ML; MG/5ML
30 SOLUTION ORAL ONCE
Status: DISCONTINUED | OUTPATIENT
Start: 2023-03-29 | End: 2023-03-28

## 2023-03-28 RX ORDER — MEPERIDINE HYDROCHLORIDE 25 MG/ML
12.5 INJECTION INTRAMUSCULAR; INTRAVENOUS; SUBCUTANEOUS EVERY 10 MIN PRN
Status: DISCONTINUED | OUTPATIENT
Start: 2023-03-28 | End: 2023-03-28 | Stop reason: HOSPADM

## 2023-03-28 RX ORDER — LIDOCAINE HYDROCHLORIDE AND EPINEPHRINE 10; 10 MG/ML; UG/ML
INJECTION, SOLUTION INFILTRATION; PERINEURAL
Status: DISCONTINUED | OUTPATIENT
Start: 2023-03-28 | End: 2023-03-28 | Stop reason: HOSPADM

## 2023-03-28 RX ORDER — ACETAMINOPHEN 500 MG
1000 TABLET ORAL ONCE
Status: DISCONTINUED | OUTPATIENT
Start: 2023-03-29 | End: 2023-03-28

## 2023-03-28 RX ORDER — ROCURONIUM BROMIDE 10 MG/ML
INJECTION, SOLUTION INTRAVENOUS
Status: DISCONTINUED | OUTPATIENT
Start: 2023-03-28 | End: 2023-03-28

## 2023-03-28 RX ORDER — METFORMIN HYDROCHLORIDE 500 MG/1
500 TABLET ORAL 2 TIMES DAILY WITH MEALS
Status: DISCONTINUED | OUTPATIENT
Start: 2023-03-29 | End: 2023-03-29 | Stop reason: HOSPADM

## 2023-03-28 RX ORDER — ONDANSETRON 2 MG/ML
4 INJECTION INTRAMUSCULAR; INTRAVENOUS ONCE AS NEEDED
Status: COMPLETED | OUTPATIENT
Start: 2023-03-28 | End: 2023-03-28

## 2023-03-28 RX ORDER — ALBUTEROL SULFATE 90 UG/1
2 AEROSOL, METERED RESPIRATORY (INHALATION) EVERY 4 HOURS PRN
Status: DISCONTINUED | OUTPATIENT
Start: 2023-03-28 | End: 2023-03-29 | Stop reason: HOSPADM

## 2023-03-28 RX ORDER — NEOMYCIN SULFATE, POLYMYXIN B SULFATE AND DEXAMETHASONE 3.5; 10000; 1 MG/ML; [USP'U]/ML; MG/ML
1 SUSPENSION/ DROPS OPHTHALMIC DAILY
Status: DISCONTINUED | OUTPATIENT
Start: 2023-03-29 | End: 2023-03-29 | Stop reason: HOSPADM

## 2023-03-28 RX ORDER — CEFAZOLIN SODIUM 2 G/50ML
2 SOLUTION INTRAVENOUS
Status: DISCONTINUED | OUTPATIENT
Start: 2023-03-28 | End: 2023-03-28 | Stop reason: HOSPADM

## 2023-03-28 RX ORDER — SODIUM CHLORIDE, SODIUM GLUCONATE, SODIUM ACETATE, POTASSIUM CHLORIDE AND MAGNESIUM CHLORIDE 30; 37; 368; 526; 502 MG/100ML; MG/100ML; MG/100ML; MG/100ML; MG/100ML
INJECTION, SOLUTION INTRAVENOUS CONTINUOUS
Status: DISCONTINUED | OUTPATIENT
Start: 2023-03-29 | End: 2023-03-29

## 2023-03-28 RX ORDER — ONDANSETRON 2 MG/ML
4 INJECTION INTRAMUSCULAR; INTRAVENOUS DAILY PRN
Status: DISCONTINUED | OUTPATIENT
Start: 2023-03-28 | End: 2023-03-28 | Stop reason: HOSPADM

## 2023-03-28 RX ORDER — BACITRACIN 500 [USP'U]/G
OINTMENT TOPICAL
Status: DISCONTINUED | OUTPATIENT
Start: 2023-03-28 | End: 2023-03-28 | Stop reason: HOSPADM

## 2023-03-28 RX ORDER — ENOXAPARIN SODIUM 100 MG/ML
30 INJECTION SUBCUTANEOUS EVERY 12 HOURS
Status: DISCONTINUED | OUTPATIENT
Start: 2023-03-29 | End: 2023-03-29 | Stop reason: HOSPADM

## 2023-03-28 RX ORDER — CETIRIZINE HYDROCHLORIDE 10 MG/1
10 TABLET ORAL NIGHTLY
Status: DISCONTINUED | OUTPATIENT
Start: 2023-03-28 | End: 2023-03-29 | Stop reason: HOSPADM

## 2023-03-28 RX ORDER — FAMOTIDINE 10 MG/ML
20 INJECTION INTRAVENOUS EVERY 12 HOURS
Status: DISCONTINUED | OUTPATIENT
Start: 2023-03-28 | End: 2023-03-29 | Stop reason: HOSPADM

## 2023-03-28 RX ORDER — ACETAMINOPHEN 325 MG/1
650 TABLET ORAL EVERY 4 HOURS PRN
Status: DISCONTINUED | OUTPATIENT
Start: 2023-03-28 | End: 2023-03-29 | Stop reason: HOSPADM

## 2023-03-28 RX ORDER — ACETAMINOPHEN 10 MG/ML
1000 INJECTION, SOLUTION INTRAVENOUS ONCE
Status: COMPLETED | OUTPATIENT
Start: 2023-03-28 | End: 2023-03-28

## 2023-03-28 RX ORDER — SODIUM CHLORIDE 9 MG/ML
INJECTION, SOLUTION INTRAVENOUS CONTINUOUS
Status: DISCONTINUED | OUTPATIENT
Start: 2023-03-28 | End: 2023-03-29 | Stop reason: HOSPADM

## 2023-03-28 RX ORDER — SODIUM CITRATE AND CITRIC ACID MONOHYDRATE 334; 500 MG/5ML; MG/5ML
30 SOLUTION ORAL ONCE
Status: COMPLETED | OUTPATIENT
Start: 2023-03-28 | End: 2023-03-28

## 2023-03-28 RX ORDER — FENTANYL CITRATE 50 UG/ML
INJECTION, SOLUTION INTRAMUSCULAR; INTRAVENOUS
Status: DISCONTINUED | OUTPATIENT
Start: 2023-03-28 | End: 2023-03-28

## 2023-03-28 RX ORDER — ONDANSETRON 2 MG/ML
INJECTION INTRAMUSCULAR; INTRAVENOUS
Status: DISCONTINUED | OUTPATIENT
Start: 2023-03-28 | End: 2023-03-28

## 2023-03-28 RX ORDER — GLYCOPYRROLATE 0.2 MG/ML
INJECTION INTRAMUSCULAR; INTRAVENOUS
Status: DISCONTINUED | OUTPATIENT
Start: 2023-03-28 | End: 2023-03-28

## 2023-03-28 RX ORDER — HYDROMORPHONE HYDROCHLORIDE 2 MG/ML
1 INJECTION, SOLUTION INTRAMUSCULAR; INTRAVENOUS; SUBCUTANEOUS EVERY 4 HOURS PRN
Status: DISCONTINUED | OUTPATIENT
Start: 2023-03-28 | End: 2023-03-29 | Stop reason: HOSPADM

## 2023-03-28 RX ORDER — MUPIROCIN 20 MG/G
1 OINTMENT TOPICAL 2 TIMES DAILY
Status: DISCONTINUED | OUTPATIENT
Start: 2023-03-28 | End: 2023-03-28 | Stop reason: HOSPADM

## 2023-03-28 RX ORDER — ACETAMINOPHEN 500 MG
1000 TABLET ORAL ONCE
Status: COMPLETED | OUTPATIENT
Start: 2023-03-28 | End: 2023-03-28

## 2023-03-28 RX ORDER — CEFAZOLIN SODIUM 1 G/3ML
INJECTION, POWDER, FOR SOLUTION INTRAMUSCULAR; INTRAVENOUS
Status: DISCONTINUED | OUTPATIENT
Start: 2023-03-28 | End: 2023-03-28 | Stop reason: HOSPADM

## 2023-03-28 RX ORDER — HYDROMORPHONE HYDROCHLORIDE 2 MG/ML
0.4 INJECTION, SOLUTION INTRAMUSCULAR; INTRAVENOUS; SUBCUTANEOUS EVERY 5 MIN PRN
Status: DISCONTINUED | OUTPATIENT
Start: 2023-03-28 | End: 2023-03-28 | Stop reason: HOSPADM

## 2023-03-28 RX ORDER — ONDANSETRON 2 MG/ML
INJECTION INTRAMUSCULAR; INTRAVENOUS
Status: COMPLETED
Start: 2023-03-28 | End: 2023-03-28

## 2023-03-28 RX ORDER — LOSARTAN POTASSIUM 50 MG/1
50 TABLET ORAL DAILY
Status: DISCONTINUED | OUTPATIENT
Start: 2023-03-28 | End: 2023-03-29 | Stop reason: HOSPADM

## 2023-03-28 RX ORDER — CEFAZOLIN SODIUM IN 0.9 % NACL 2 G/100 ML
PLASTIC BAG, INJECTION (ML) INTRAVENOUS
Status: DISCONTINUED | OUTPATIENT
Start: 2023-03-28 | End: 2023-03-28

## 2023-03-28 RX ORDER — PROMETHAZINE HYDROCHLORIDE 25 MG/1
25 SUPPOSITORY RECTAL EVERY 6 HOURS PRN
Status: DISCONTINUED | OUTPATIENT
Start: 2023-03-28 | End: 2023-03-29 | Stop reason: HOSPADM

## 2023-03-28 RX ORDER — TAMSULOSIN HYDROCHLORIDE 0.4 MG/1
1 CAPSULE ORAL NIGHTLY
Status: DISCONTINUED | OUTPATIENT
Start: 2023-03-28 | End: 2023-03-29 | Stop reason: HOSPADM

## 2023-03-28 RX ORDER — OXYMETAZOLINE HCL 0.05 %
SPRAY, NON-AEROSOL (ML) NASAL
Status: DISCONTINUED | OUTPATIENT
Start: 2023-03-28 | End: 2023-03-28 | Stop reason: HOSPADM

## 2023-03-28 RX ORDER — OXYCODONE AND ACETAMINOPHEN 10; 325 MG/1; MG/1
1 TABLET ORAL EVERY 4 HOURS PRN
Status: DISCONTINUED | OUTPATIENT
Start: 2023-03-28 | End: 2023-03-29 | Stop reason: HOSPADM

## 2023-03-28 RX ORDER — ATORVASTATIN CALCIUM 40 MG/1
40 TABLET, FILM COATED ORAL DAILY
Status: DISCONTINUED | OUTPATIENT
Start: 2023-03-28 | End: 2023-03-29 | Stop reason: HOSPADM

## 2023-03-28 RX ORDER — CEFAZOLIN SODIUM 2 G/50ML
2 SOLUTION INTRAVENOUS
Status: DISCONTINUED | OUTPATIENT
Start: 2023-03-28 | End: 2023-03-28

## 2023-03-28 RX ORDER — ONDANSETRON 2 MG/ML
8 INJECTION INTRAMUSCULAR; INTRAVENOUS EVERY 4 HOURS PRN
Status: DISCONTINUED | OUTPATIENT
Start: 2023-03-28 | End: 2023-03-29 | Stop reason: HOSPADM

## 2023-03-28 RX ORDER — LIDOCAINE HYDROCHLORIDE 10 MG/ML
1 INJECTION, SOLUTION EPIDURAL; INFILTRATION; INTRACAUDAL; PERINEURAL ONCE
Status: DISCONTINUED | OUTPATIENT
Start: 2023-03-29 | End: 2023-03-28 | Stop reason: HOSPADM

## 2023-03-28 RX ORDER — LIDOCAINE HYDROCHLORIDE 20 MG/ML
INJECTION, SOLUTION EPIDURAL; INFILTRATION; INTRACAUDAL; PERINEURAL
Status: DISCONTINUED | OUTPATIENT
Start: 2023-03-28 | End: 2023-03-28

## 2023-03-28 RX ORDER — HYDROMORPHONE HYDROCHLORIDE 2 MG/ML
0.2 INJECTION, SOLUTION INTRAMUSCULAR; INTRAVENOUS; SUBCUTANEOUS EVERY 5 MIN PRN
Status: DISCONTINUED | OUTPATIENT
Start: 2023-03-28 | End: 2023-03-28 | Stop reason: HOSPADM

## 2023-03-28 RX ORDER — DEXAMETHASONE SODIUM PHOSPHATE 4 MG/ML
INJECTION, SOLUTION INTRA-ARTICULAR; INTRALESIONAL; INTRAMUSCULAR; INTRAVENOUS; SOFT TISSUE
Status: DISCONTINUED | OUTPATIENT
Start: 2023-03-28 | End: 2023-03-28

## 2023-03-28 RX ORDER — DIPHENHYDRAMINE HYDROCHLORIDE 50 MG/ML
25 INJECTION INTRAMUSCULAR; INTRAVENOUS EVERY 6 HOURS PRN
Status: DISCONTINUED | OUTPATIENT
Start: 2023-03-28 | End: 2023-03-28 | Stop reason: HOSPADM

## 2023-03-28 RX ADMIN — CEFAZOLIN SODIUM 2 G: 2 SOLUTION INTRAVENOUS at 11:03

## 2023-03-28 RX ADMIN — MIDAZOLAM HYDROCHLORIDE 2 MG: 1 INJECTION, SOLUTION INTRAMUSCULAR; INTRAVENOUS at 07:03

## 2023-03-28 RX ADMIN — HYDROMORPHONE HYDROCHLORIDE 0.4 MG: 2 INJECTION, SOLUTION INTRAMUSCULAR; INTRAVENOUS; SUBCUTANEOUS at 12:03

## 2023-03-28 RX ADMIN — KETOROLAC TROMETHAMINE 30 MG: 30 INJECTION, SOLUTION INTRAMUSCULAR; INTRAVENOUS at 11:03

## 2023-03-28 RX ADMIN — TAMSULOSIN HYDROCHLORIDE 0.4 MG: 0.4 CAPSULE ORAL at 09:03

## 2023-03-28 RX ADMIN — SODIUM CHLORIDE, SODIUM GLUCONATE, SODIUM ACETATE, POTASSIUM CHLORIDE AND MAGNESIUM CHLORIDE: 526; 502; 368; 37; 30 INJECTION, SOLUTION INTRAVENOUS at 07:03

## 2023-03-28 RX ADMIN — ONDANSETRON 4 MG: 2 INJECTION INTRAMUSCULAR; INTRAVENOUS at 11:03

## 2023-03-28 RX ADMIN — MONTELUKAST 10 MG: 10 TABLET, FILM COATED ORAL at 09:03

## 2023-03-28 RX ADMIN — Medication 2 G: at 08:03

## 2023-03-28 RX ADMIN — PROPOFOL 100 MG: 10 INJECTION, EMULSION INTRAVENOUS at 07:03

## 2023-03-28 RX ADMIN — HYDROMORPHONE HYDROCHLORIDE 0.4 MG: 2 INJECTION, SOLUTION INTRAMUSCULAR; INTRAVENOUS; SUBCUTANEOUS at 11:03

## 2023-03-28 RX ADMIN — ONDANSETRON 4 MG: 2 INJECTION INTRAMUSCULAR; INTRAVENOUS at 04:03

## 2023-03-28 RX ADMIN — CETIRIZINE HYDROCHLORIDE 10 MG: 10 TABLET, FILM COATED ORAL at 09:03

## 2023-03-28 RX ADMIN — GLYCOPYRROLATE 0.2 MG: 0.2 INJECTION INTRAMUSCULAR; INTRAVENOUS at 07:03

## 2023-03-28 RX ADMIN — ONDANSETRON 4 MG: 2 INJECTION INTRAMUSCULAR; INTRAVENOUS at 10:03

## 2023-03-28 RX ADMIN — SODIUM CITRATE AND CITRIC ACID MONOHYDRATE 30 ML: 500; 334 SOLUTION ORAL at 06:03

## 2023-03-28 RX ADMIN — FAMOTIDINE 20 MG: 10 INJECTION, SOLUTION INTRAVENOUS at 09:03

## 2023-03-28 RX ADMIN — SUGAMMADEX 200 MG: 100 INJECTION, SOLUTION INTRAVENOUS at 08:03

## 2023-03-28 RX ADMIN — ACETAMINOPHEN 1000 MG: 10 INJECTION, SOLUTION INTRAVENOUS at 09:03

## 2023-03-28 RX ADMIN — ROCURONIUM BROMIDE 70 MG: 10 SOLUTION INTRAVENOUS at 07:03

## 2023-03-28 RX ADMIN — PROPOFOL 150 MG: 10 INJECTION, EMULSION INTRAVENOUS at 07:03

## 2023-03-28 RX ADMIN — DEXAMETHASONE SODIUM PHOSPHATE 4 MG: 4 INJECTION, SOLUTION INTRA-ARTICULAR; INTRALESIONAL; INTRAMUSCULAR; INTRAVENOUS; SOFT TISSUE at 08:03

## 2023-03-28 RX ADMIN — PROCHLORPERAZINE EDISYLATE 5 MG: 5 INJECTION INTRAMUSCULAR; INTRAVENOUS at 12:03

## 2023-03-28 RX ADMIN — FAMOTIDINE 20 MG: 10 INJECTION, SOLUTION INTRAVENOUS at 12:03

## 2023-03-28 RX ADMIN — ONDANSETRON 8 MG: 4 TABLET, ORALLY DISINTEGRATING ORAL at 07:03

## 2023-03-28 RX ADMIN — SODIUM CHLORIDE: 9 INJECTION, SOLUTION INTRAVENOUS at 12:03

## 2023-03-28 RX ADMIN — CEFAZOLIN SODIUM 2 G: 2 SOLUTION INTRAVENOUS at 03:03

## 2023-03-28 RX ADMIN — FENTANYL CITRATE 100 MCG: 50 INJECTION, SOLUTION INTRAMUSCULAR; INTRAVENOUS at 07:03

## 2023-03-28 RX ADMIN — LIDOCAINE HYDROCHLORIDE 4 MG: 20 INJECTION, SOLUTION EPIDURAL; INFILTRATION; INTRACAUDAL; PERINEURAL at 07:03

## 2023-03-28 RX ADMIN — FENTANYL CITRATE 50 MCG: 50 INJECTION, SOLUTION INTRAMUSCULAR; INTRAVENOUS at 10:03

## 2023-03-28 RX ADMIN — FENTANYL CITRATE 50 MCG: 50 INJECTION, SOLUTION INTRAMUSCULAR; INTRAVENOUS at 08:03

## 2023-03-28 RX ADMIN — ACETAMINOPHEN 1000 MG: 500 TABLET ORAL at 06:03

## 2023-03-28 NOTE — ANESTHESIA PROCEDURE NOTES
Arterial    Diagnosis: pituitary tumor    Patient location during procedure: holding area  Procedure start time: 3/28/2023 7:15 AM  Timeout: 3/28/2023 7:15 AM  Procedure end time: 3/28/2023 7:16 AM    Staffing  Authorizing Provider: Elvin Villela MD  Performing Provider: Lenora Curiel    Anesthesiologist was present at the time of the procedure.    Preanesthetic Checklist  Completed: patient identified, IV checked, site marked, risks and benefits discussed, surgical consent, monitors and equipment checked, pre-op evaluation, timeout performed and anesthesia consent givenArterial  Skin Prep: chlorhexidine gluconate and isopropyl alcohol  Local Infiltration: lidocaine  Orientation: right  Location: radial    Catheter Size: 20 G  Catheter placement by Ultrasound guidance. Heme positive aspiration all ports.   Vessel Caliber: small, patent, compressibility normal  Vascular Doppler:  not done  Needle advanced into vessel with real time Ultrasound guidance.  Guidewire confirmed in vessel.  Sterile sheath used.  Image recorded and saved.Insertion Attempts: 1  Assessment  Dressing: secured with tape and tegaderm  Patient: Tolerated well

## 2023-03-28 NOTE — NURSING
Nurses Note -- 4 Eyes      3/28/2023   4:50 PM      Skin assessed during: Admit      [x] No Pressure Injuries Present    [x]Prevention Measures Documented      [] Yes- Altered Skin Integrity Present or Discovered   [] LDA Added if Not in Epic (Describe Wound)   [] New Altered Skin Integrity was Present on Admit and Documented in LDA   [] Wound Image Taken    Wound Care Consulted? No    Attending Nurse:  Nvajot Guardado RN     Second RN/Staff Member:  Mila Mueller RN

## 2023-03-28 NOTE — BRIEF OP NOTE
[facility] - Periop Services  Brief Operative Note    SUMMARY     Surgery Date: 3/28/2023     Surgeon(s) and Role:  Panel 1:     * Jassi Vasquez MD - Primary  Panel 2:     * Champ Sofia MD - Primary    Assisting Surgeon: None    Pre-op Diagnosis:  Pituitary adenoma [D35.2]    Post-op Diagnosis:  Post-Op Diagnosis Codes:     * Pituitary adenoma [D35.2]    Procedure(s) (LRB):  EXCISION, NEOPLASM, PITUITARY, TRANSSPHENOIDAL APPROACH, USING COMPUTER-ASSISTED NAVIGATION (N/A)  EXCISION, NEOPLASM, PITUITARY, TRANSSPHENOIDAL APPROACH, USING COMPUTER-ASSISTED NAVIGATION (N/A)    Anesthesia: General    Operative Findings: Tumor identified on R with gross total resection achieved; no obvious tumor on L    Estimated Blood Loss: * No values recorded between 3/28/2023  8:15 AM and 3/28/2023 10:47 AM *    Estimated Blood Loss has been documented.100cc         Specimens:   Specimen (24h ago, onward)       Start     Ordered    03/28/23 0956  Specimen to Pathology  RELEASE UPON ORDERING        Comments: Specimen A: DO NOT FREEZE ALL    Specimen B: DO NOT FREEZE ALL     References:    Click here for ordering Quick Tip   Question:  Release to patient  Answer:  Immediate    03/28/23 7335                    [id number]

## 2023-03-28 NOTE — ANESTHESIA PREPROCEDURE EVALUATION
03/27/2023  Jacques Garcia is a 68 y.o., male.    Pre-op Diagnosis: Pituitary adenoma [D35.2]    Procedure(s):  EXCISION, NEOPLASM, PITUITARY, TRANSSPHENOIDAL APPROACH, USING COMPUTER-ASSISTED NAVIGATION  EXCISION, NEOPLASM, PITUITARY, TRANSSPHENOIDAL APPROACH, USING COMPUTER-ASSISTED NAVIGATION     Review of patient's allergies indicates:  No Known Allergies    Current Outpatient Medications   Medication Instructions    albuterol (PROVENTIL/VENTOLIN HFA) 90 mcg/actuation inhaler 2 puffs, Inhalation, Every 4 hours PRN    AMOXICILLIN ORAL Oral, 3 times daily    diphenhydrAMINE (SOMINEX) 25 mg, Oral, Nightly PRN    fluticasone propionate (FLOVENT DISKUS) 50 mcg/actuation DsDv 2 puffs, Inhalation, 2 times daily, Controller    levocetirizine (XYZAL) 5 mg, Oral, Nightly    linaCLOtide (LINZESS) 72 mcg, Oral, As needed (PRN)    losartan (COZAAR) 50 mg, Oral, Daily    metFORMIN (GLUCOPHAGE) 500 mg, Oral, 2 times daily with meals    montelukast (SINGULAIR) 10 mg, Oral, Nightly    neomycin-polymyxin-dexamethasone (MAXITROL) 3.5mg/mL-10,000 unit/mL-0.1 % DrpS Daily    oxyCODONE-acetaminophen (PERCOCET)  mg per tablet Every 12 hours PRN    polyethylene glycol 400 (VISINE DRY EYE RELIEF OPHT) Ophthalmic, 3 times daily PRN    predniSONE (DELTASONE) 20 MG tablet prednisone 20 mg tablet   Take 1 tablet every day by oral route for 5 days.    rosuvastatin (CRESTOR) 20 mg, Oral, Daily    tadalafiL (CIALIS) 20 mg, Oral, Daily    tamsulosin (FLOMAX) 0.4 mg Cap 1 capsule, Oral, Nightly       FL EXCIS PITUITARY,TRANSNASAL/SEPTAL [17543] (EXCISION, AUDREY*    Past Medical History:   Diagnosis Date    Asthma     Benign neoplasm of pituitary gland     BPH (benign prostatic hyperplasia)     Cervical disc disorder     Dizziness     DM (diabetes mellitus)     Heartburn     History of kidney stones     HLD  (hyperlipidemia)     HTN (hypertension)     Insomnia     Kidney stones     Numbness of tongue     Prostate cancer 2016    treated with radiation    SOB (shortness of breath)     at times    Spondylosis of cervical region without myelopathy or radiculopathy        Past Surgical History:   Procedure Laterality Date    CATARACT EXTRACTION Bilateral     COLONOSCOPY      EPIDURAL STEROID INJECTION INTO CERVICAL SPINE      multiple    MULTIPLE TOOTH EXTRACTIONS      neck cyst       Lab Results   Component Value Date    WBC 5.8 03/20/2023    HGB 13.5 (L) 03/20/2023    HCT 44.1 03/20/2023    MCV 85.5 03/20/2023     03/20/2023   BMP  Lab Results   Component Value Date     03/20/2023    K 4.5 03/20/2023    CO2 22 (L) 03/20/2023    BUN 15.0 03/20/2023    CREATININE 0.80 03/20/2023    CALCIUM 9.6 03/20/2023              Pre-op Assessment    I have reviewed the Patient Summary Reports.    I have reviewed the NPO Status.   I have reviewed the Medications.     Review of Systems  Anesthesia Hx:  No problems with previous Anesthesia  Denies Family Hx of Anesthesia complications.   Denies Personal Hx of Anesthesia complications.   Social:  Non-Smoker    Hematology/Oncology:        Current/Recent Cancer. Other (see Oncology comments) Oncology Comments: PROSTATE CANCER   Cardiovascular:   Exercise tolerance: poor Hypertension  Denies Angina.  Denies Orthopnea.  Denies PND. hyperlipidemia  Denies WAY.  Functional Capacity low / < 4 METS    Pulmonary:   Asthma Shortness of breath    Renal/:   renal calculi BPH    Musculoskeletal:   Arthritis     Neurological:   Denies TIA. Denies CVA.  Neuro Symptoms of dizziness  Chronic Pain Syndrome (chronic neck pain)   Endocrine:   Diabetes    Psych:  Psychiatric Normal           Physical Exam  General: Well nourished, Alert and Oriented    Airway:  Mallampati: III   Mouth Opening: Normal  TM Distance: Normal  Tongue: Normal  Neck ROM: Extension Decreased    Dental:Missing  all molars & multiple premolar; some chips; denies loose teeth  Chest/Lungs:  Clear to auscultation    Heart:  Rate: Normal  Rhythm: Regular Rhythm  No pretibial edema  No carotid bruits      Anesthesia Plan  Type of Anesthesia, risks & benefits discussed:    Anesthesia Type: Gen ETT  Intra-op Monitoring Plan: Standard ASA Monitors and Art Line  Post Op Pain Control Plan: multimodal analgesia  Induction:  IV  Airway Plan: Direct and Video, Post-Induction  Informed Consent: Informed consent signed with the Patient and all parties understand the risks and agree with anesthesia plan.  All questions answered. Patient consented to blood products? Yes  ASA Score: 3  Day of Surgery Review of History & Physical: H&P Update referred to the surgeon/provider.    Ready For Surgery From Anesthesia Perspective.     .

## 2023-03-28 NOTE — PROGRESS NOTES
Pt and multiple family members present for report. Post op orders reviewed. Pt attached to v/s machine and vitals reviewed. Procedure site, Ivs, ART Line rwemoval site, and price all assessed and intact. Everyone understands pt info with no further questions.

## 2023-03-28 NOTE — OP NOTE
DATE OF OPERATION:   March 28, 2023     PREOPERATIVE DIAGNOSIS:   1.  Pituitary adenoma     POSTOPERATIVE DIAGNOSIS:   1.  Pituitary adenoma     SURGEON:  Jassi Vasquez M.D.    OTOLARYNGOLOGIST/CO-SURGEON: Champ Sofia M.D.     PROCEDURE:   1.  Endoscopic transsphenoidal resection of pituitary macroadenoma   2.  Stereotactic volumetric resection with the Stealth neuro-navigation system     ANESTHESIA:   General endotracheal     BLOOD LOSS: 100 cc     SPECIMEN(s):   Tumor for frozen and permanent section     DRAIN: none     COMPLICATIONS:   None     HISTORY:  The patient is a 68-year-old gentleman with an incidental finding of a pituitary mass that has enlarged slightly leading to consideration of resection.  Options were discussed and transsphenoidal tumor resection was elected. The patient understood and accepted the nature of this surgery as well as its attendant risks.     FINDINGS:   There was an expected amount of tumor on the patient's right confirmed by touch prep, but no obvious tumor could be identified on the left.  Soft tumor on the patient's right was removed in an extracapsular fashion. A small piece of firmer tissue was sent for path and felt to be intermediate in appearance so no further attempt at resection was pursued. The suprasellar arachnoid was visualized and no active CSF leakage was noted. The approach and closure is dictated under separate report by Dr. Sofia.  The patient tolerated the procedure well.     PROCEDURE IN DETAIL:   Prior to surgery, the patient underwent MRI and CT imaging with the Neuronavigation protocol and this was transferred to the Piper computer. The fiducials were pre-registered. The patient was then brought to the operating room. After endotracheal intubation and induction of general anesthesia, the patient was given intravenous antibiotics and 50 milligrams of hydrocortisone intravenously prior to the start of the procedure. Then, the patient was placed in the  supine position and the head placed in Urena 3-point pin fixation. The head was elevated approximately 20 degrees and then positioned optimally for the remainder of the procedure with the Urena 3-point pin fixation headrest. Then the reference arc was set up and the patient registered. Position accuracy was excellent, and then the approach was carried out by the otolaryngologist. This is dictated under separate report. Once the sphenoid sinus was entered, localization was checked and Medtronic landmarks were confirmed. Then the face of the sphenoid sinus was removed with pituitary rongeurs and the Koros rotating Kerrison rongeur. Mucosa as needed was removed. The sphenoid sinus was irrigated copiously with saline containing Bacitracin. Then the boundaries of the bony opening were localized and confirmation was obtained in attempts to maximize the safe opening for the tumor resection. Then the dura was opened in a cruciate fashion with an 11 blade. Dissectors as well as ring curettes in a four-quadrant fashion were used to empty the tumor cavity of tumor tissue. Please refer to the Findings Section for specific details regarding this patient.  Bleeding points were controlled temporarily with Gelfoam and/or fibrillar Surgicel. Again, the wound was irrigated copiously with saline containing Bacitracin.  Closure was dictated under separate report by the otolaryngologist. The patient was then taken to the post anesthetic care unit in satisfactory condition with correct sponge and needle counts.

## 2023-03-28 NOTE — ANESTHESIA PROCEDURE NOTES
Intubation    Date/Time: 3/28/2023 7:33 AM  Performed by: Lenora Curiel  Authorized by: Elvin Villela MD     Intubation:     Induction:  Intravenous    Intubated:  Postinduction    Mask Ventilation:  Easy with oral airway    Attempts:  1    Attempted By:  Student    Method of Intubation:  Direct and video laryngoscopy    Blade:  Rogers 3    Laryngeal View Grade: Grade IIA - cords partially seen      Difficult Airway Encountered?: No      Complications:  None    Airway Device:  Oral endotracheal tube    Airway Device Size:  7.5    Style/Cuff Inflation:  Cuffed (inflated to minimal occlusive pressure)    Inflation Amount (mL):  6    Tube secured:  21    Secured at:  The lips    Placement Verified By:  Capnometry    Complicating Factors:  None    Findings Post-Intubation:  BS equal bilateral and atraumatic/condition of teeth unchanged

## 2023-03-28 NOTE — ANESTHESIA POSTPROCEDURE EVALUATION
Anesthesia Post Evaluation    Patient: Jacques Garcia    Procedure(s) Performed: Procedure(s) (LRB):  EXCISION, NEOPLASM, PITUITARY, TRANSSPHENOIDAL APPROACH, USING COMPUTER-ASSISTED NAVIGATION (N/A)  EXCISION, NEOPLASM, PITUITARY, TRANSSPHENOIDAL APPROACH, USING COMPUTER-ASSISTED NAVIGATION (N/A)    Final Anesthesia Type: general      Patient location during evaluation: PACU  Patient participation: Yes- Able to Participate  Level of consciousness: awake and alert and oriented  Post-procedure vital signs: reviewed and stable  Pain management: adequate  Airway patency: patent    PONV status at discharge: No PONV  Anesthetic complications: no      Cardiovascular status: hemodynamically stable  Respiratory status: unassisted  Hydration status: euvolemic  Follow-up not needed.          Vitals Value Taken Time   /67 03/28/23 1251   Temp 36.3 °C (97.3 °F) 03/28/23 1140   Pulse 59 03/28/23 1257   Resp 10 03/28/23 1257   SpO2 98 % 03/28/23 1257   Vitals shown include unvalidated device data.      No case tracking events are documented in the log.      Pain/Gracia Score: Pain Rating Prior to Med Admin: 6 (3/28/2023 11:45 AM)  Gracia Score: 9 (3/28/2023 12:00 PM)

## 2023-03-28 NOTE — TRANSFER OF CARE
"Anesthesia Transfer of Care Note    Patient: Jacques Garcia    Procedure(s) Performed: Procedure(s) (LRB):  EXCISION, NEOPLASM, PITUITARY, TRANSSPHENOIDAL APPROACH, USING COMPUTER-ASSISTED NAVIGATION (N/A)  EXCISION, NEOPLASM, PITUITARY, TRANSSPHENOIDAL APPROACH, USING COMPUTER-ASSISTED NAVIGATION (N/A)    Patient location: PACU    Anesthesia Type: general    Transport from OR: Transported from OR on room air with adequate spontaneous ventilation    Post pain: adequate analgesia    Post assessment: no apparent anesthetic complications    Post vital signs: stable    Level of consciousness: responds to stimulation    Nausea/Vomiting: no nausea/vomiting    Complications: none    Transfer of care protocol was followed      Last vitals:   Visit Vitals  /77   Pulse 61   Temp (!) 24.3 °C (75.8 °F) (Oral)   Resp 20   Ht 5' 5" (1.651 m)   Wt 75.8 kg (167 lb 1.7 oz)   SpO2 100%   BMI 27.81 kg/m²     "

## 2023-03-29 VITALS
OXYGEN SATURATION: 96 % | TEMPERATURE: 99 F | HEART RATE: 76 BPM | HEIGHT: 65 IN | BODY MASS INDEX: 27.85 KG/M2 | DIASTOLIC BLOOD PRESSURE: 78 MMHG | RESPIRATION RATE: 16 BRPM | SYSTOLIC BLOOD PRESSURE: 139 MMHG | WEIGHT: 167.13 LBS

## 2023-03-29 LAB
ALBUMIN SERPL-MCNC: 3.6 G/DL (ref 3.4–4.8)
ALBUMIN/GLOB SERPL: 1.4 RATIO (ref 1.1–2)
ALP SERPL-CCNC: 61 UNIT/L (ref 40–150)
ALT SERPL-CCNC: 13 UNIT/L (ref 0–55)
AST SERPL-CCNC: 13 UNIT/L (ref 5–34)
BASOPHILS # BLD AUTO: 0.02 X10(3)/MCL (ref 0–0.2)
BASOPHILS NFR BLD AUTO: 0.2 %
BILIRUBIN DIRECT+TOT PNL SERPL-MCNC: 0.3 MG/DL
BUN SERPL-MCNC: 13.5 MG/DL (ref 8.4–25.7)
CALCIUM SERPL-MCNC: 8.6 MG/DL (ref 8.8–10)
CHLORIDE SERPL-SCNC: 109 MMOL/L (ref 98–107)
CO2 SERPL-SCNC: 23 MMOL/L (ref 23–31)
CORTIS SERPL-SCNC: 6.5 UG/DL
CREAT SERPL-MCNC: 0.85 MG/DL (ref 0.73–1.18)
EOSINOPHIL # BLD AUTO: 0.01 X10(3)/MCL (ref 0–0.9)
EOSINOPHIL NFR BLD AUTO: 0.1 %
ERYTHROCYTE [DISTWIDTH] IN BLOOD BY AUTOMATED COUNT: 14.6 % (ref 11.5–17)
GFR SERPLBLD CREATININE-BSD FMLA CKD-EPI: >60 MLS/MIN/1.73/M2
GLOBULIN SER-MCNC: 2.5 GM/DL (ref 2.4–3.5)
GLUCOSE SERPL-MCNC: 129 MG/DL (ref 82–115)
HCT VFR BLD AUTO: 38.4 % (ref 42–52)
HGB BLD-MCNC: 12.1 G/DL (ref 14–18)
IMM GRANULOCYTES # BLD AUTO: 0.05 X10(3)/MCL (ref 0–0.04)
IMM GRANULOCYTES NFR BLD AUTO: 0.5 %
LYMPHOCYTES # BLD AUTO: 0.93 X10(3)/MCL (ref 0.6–4.6)
LYMPHOCYTES NFR BLD AUTO: 8.5 %
MCH RBC QN AUTO: 26.2 PG (ref 27–31)
MCHC RBC AUTO-ENTMCNC: 31.5 G/DL (ref 33–36)
MCV RBC AUTO: 83.1 FL (ref 80–94)
MONOCYTES # BLD AUTO: 0.84 X10(3)/MCL (ref 0.1–1.3)
MONOCYTES NFR BLD AUTO: 7.7 %
NEUTROPHILS # BLD AUTO: 9.07 X10(3)/MCL (ref 2.1–9.2)
NEUTROPHILS NFR BLD AUTO: 83 %
NRBC BLD AUTO-RTO: 0 %
PLATELET # BLD AUTO: 296 X10(3)/MCL (ref 130–400)
PMV BLD AUTO: 10.9 FL (ref 7.4–10.4)
POTASSIUM SERPL-SCNC: 4.2 MMOL/L (ref 3.5–5.1)
PROT SERPL-MCNC: 6.1 GM/DL (ref 5.8–7.6)
RBC # BLD AUTO: 4.62 X10(6)/MCL (ref 4.7–6.1)
SODIUM SERPL-SCNC: 140 MMOL/L (ref 136–145)
WBC # SPEC AUTO: 10.9 X10(3)/MCL (ref 4.5–11.5)

## 2023-03-29 PROCEDURE — 82533 TOTAL CORTISOL: CPT | Performed by: NEUROLOGICAL SURGERY

## 2023-03-29 PROCEDURE — 25000003 PHARM REV CODE 250: Performed by: NEUROLOGICAL SURGERY

## 2023-03-29 PROCEDURE — 63600175 PHARM REV CODE 636 W HCPCS: Performed by: NEUROLOGICAL SURGERY

## 2023-03-29 PROCEDURE — 99024 POSTOP FOLLOW-UP VISIT: CPT | Mod: POP,,, | Performed by: NEUROLOGICAL SURGERY

## 2023-03-29 PROCEDURE — 85025 COMPLETE CBC W/AUTO DIFF WBC: CPT | Performed by: NEUROLOGICAL SURGERY

## 2023-03-29 PROCEDURE — 99024 PR POST-OP FOLLOW-UP VISIT: ICD-10-PCS | Mod: POP,,, | Performed by: NEUROLOGICAL SURGERY

## 2023-03-29 PROCEDURE — 80053 COMPREHEN METABOLIC PANEL: CPT | Performed by: NEUROLOGICAL SURGERY

## 2023-03-29 RX ORDER — OXYCODONE AND ACETAMINOPHEN 10; 325 MG/1; MG/1
1 TABLET ORAL EVERY 4 HOURS PRN
Qty: 42 TABLET | Refills: 0 | Status: SHIPPED | OUTPATIENT
Start: 2023-03-29 | End: 2024-03-20

## 2023-03-29 RX ADMIN — LINACLOTIDE 72 MCG: 72 CAPSULE, GELATIN COATED ORAL at 12:03

## 2023-03-29 RX ADMIN — ACETAMINOPHEN 325MG 650 MG: 325 TABLET ORAL at 08:03

## 2023-03-29 RX ADMIN — ENOXAPARIN SODIUM 30 MG: 30 INJECTION SUBCUTANEOUS at 08:03

## 2023-03-29 RX ADMIN — NEOMYCIN SULFATE, POLYMYXIN B SULFATE AND DEXAMETHASONE 1 DROP: 3.5; 10000; 1 SUSPENSION/ DROPS OPHTHALMIC at 08:03

## 2023-03-29 RX ADMIN — OXYCODONE AND ACETAMINOPHEN 1 TABLET: 10; 325 TABLET ORAL at 03:03

## 2023-03-29 RX ADMIN — LOSARTAN POTASSIUM 50 MG: 50 TABLET, FILM COATED ORAL at 08:03

## 2023-03-29 RX ADMIN — FAMOTIDINE 20 MG: 10 INJECTION, SOLUTION INTRAVENOUS at 08:03

## 2023-03-29 RX ADMIN — ATORVASTATIN CALCIUM 40 MG: 40 TABLET, FILM COATED ORAL at 08:03

## 2023-03-29 RX ADMIN — OXYCODONE AND ACETAMINOPHEN 1 TABLET: 10; 325 TABLET ORAL at 11:03

## 2023-03-29 RX ADMIN — METFORMIN HYDROCHLORIDE 500 MG: 500 TABLET, FILM COATED ORAL at 08:03

## 2023-03-29 NOTE — DISCHARGE SUMMARY
[facility]  Discharge Note  Short Stay    Procedure(s) (LRB):  EXCISION, NEOPLASM, PITUITARY, TRANSSPHENOIDAL APPROACH, USING COMPUTER-ASSISTED NAVIGATION (N/A)  EXCISION, NEOPLASM, PITUITARY, TRANSSPHENOIDAL APPROACH, USING COMPUTER-ASSISTED NAVIGATION (N/A)      OUTCOME: Patient tolerated treatment/procedure well without complication and is now ready for discharge.    DISPOSITION: Home or Self Care    FINAL DIAGNOSIS:  <principal problem not specified>    FOLLOWUP: In clinic    DISCHARGE INSTRUCTIONS:  No discharge procedures on file. No straws, no nose blowing, keep head above heart; call with increased nasal drainage     Clinical Reference Documents Added to Patient Instructions         Document    PITUITARY ADENOMA (ENGLISH)            TIME SPENT ON DISCHARGE: 5 minutes

## 2023-03-29 NOTE — CONSULTS
NAME: Jacques Garcia   MRN:  [id number]   :  1955   REASON FOR CONSULTATION:   CONSULTING PHYSICIAN    ADMIT DATE: 3/28/2023  4:45 AM     INFORMANT: Epic, nurse, patient, office notes  CHIEF COMPLAINT: No chief complaint on file.   none  HPI:   Ms. Garcia has a hx of a NF pituitary microadenoma and is nearing POD 1 s/p TSH by Ciro Vasquez and [place]. It appears it was done for persistent IBARRA. I saw him in the office in 2022 to prove that it was NF. Prolactin 19, TSH normal, FT4 low at 0.80 yet repeat FT4 normal.  IGF-1 normal at 115, DEX suppression test normal. He was having milder HA then.     Patient has done well in early post OP period. Denies excessive thirst and urination. HA last night yet subsided with tx from Dr. Vasquez.         MEDICAL HISTORY:   Past Medical History:   Diagnosis Date    Asthma     Benign neoplasm of pituitary gland     BPH (benign prostatic hyperplasia)     Cervical disc disorder     Dizziness     DM (diabetes mellitus)     Heartburn     History of kidney stones     HLD (hyperlipidemia)     HTN (hypertension)     Insomnia     Kidney stones     Numbness of tongue     Prostate cancer 2016    treated with radiation    SOB (shortness of breath)     at times    Spondylosis of cervical region without myelopathy or radiculopathy       SURGICAL HISTORY:   Past Surgical History:   Procedure Laterality Date    CATARACT EXTRACTION Bilateral     COLONOSCOPY      EPIDURAL STEROID INJECTION INTO CERVICAL SPINE      multiple    MULTIPLE TOOTH EXTRACTIONS      neck cyst        FAMILY HISTORY :    Family History   Problem Relation Age of Onset    Diabetes Mother     Colon cancer Mother     Hypertension Father     Diabetes Father       SOCIAL HISTORY:    Social History     Socioeconomic History    Marital status:    Tobacco Use    Smoking status: Never    Smokeless tobacco: Never   Substance and Sexual Activity    Alcohol use: Not Currently     Comment: occasionally    Drug use:  Never    Sexual activity: Not Currently        ROS: Review of Systems   Constitutional:  Negative for appetite change, fatigue and fever.   HENT:  Positive for congestion. Negative for nosebleeds, postnasal drip and rhinorrhea.    Respiratory:  Negative for cough and chest tightness.    Cardiovascular:  Negative for chest pain and palpitations.   Gastrointestinal:  Negative for abdominal distention, abdominal pain, diarrhea and nausea.   Endocrine: Negative for polydipsia, polyphagia and polyuria.   Neurological:  Negative for dizziness and light-headedness.   Psychiatric/Behavioral:  Negative for agitation and dysphoric mood.             MEDICATIONS: Scheduled Meds:   atorvastatin  40 mg Oral Daily    cetirizine  10 mg Oral QHS    enoxaparin  30 mg Subcutaneous Q12H    famotidine (PF)  20 mg Intravenous Q12H    losartan  50 mg Oral Daily    metFORMIN  500 mg Oral BID WM    montelukast  10 mg Oral QHS    neomycin-polymyxin-dexamethasone  1 drop Both Eyes Daily    tamsulosin  1 capsule Oral QHS     Continuous Infusions:   sodium chloride 0.9% 100 mL/hr at 03/28/23 1200     PRN Meds:.acetaminophen, albuterol, artificial tears, diphenhydrAMINE, HYDROmorphone, linaCLOtide, ondansetron, ondansetron, oxyCODONE-acetaminophen, promethazine     ALLERGIES:Review of patient's allergies indicates:  No Known Allergies     PHYSICAL EXAM: Physical Exam  Constitutional:       Appearance: Normal appearance.   HENT:      Head: Normocephalic and atraumatic.      Nose: Nose normal.   Eyes:      Extraocular Movements: Extraocular movements intact.      Pupils: Pupils are equal, round, and reactive to light.   Cardiovascular:      Rate and Rhythm: Normal rate and regular rhythm.      Heart sounds: Normal heart sounds.   Pulmonary:      Effort: Pulmonary effort is normal.      Breath sounds: Normal breath sounds.   Abdominal:      General: Abdomen is flat.      Palpations: Abdomen is soft.   Skin:     General: Skin is warm and dry.    Neurological:      General: No focal deficit present.      Mental Status: He is alert and oriented to person, place, and time.   Psychiatric:         Mood and Affect: Mood normal.         Behavior: Behavior normal.            LABS:   Recent Labs   Lab 03/29/23 0411      K 4.2   CO2 23   BUN 13.5   CREATININE 0.85   CALCIUM 8.6*   BILITOT 0.3   ALKPHOS 61   ALT 13   AST 13   GLUCOSE 129*      Recent Labs   Lab 03/29/23 0411   WBC 10.9   HGB 12.1*   HCT 38.4*        No results for input(s): TSH, T3FREE, FREET4 in the last 168 hours. No results for input(s): HGBA1C in the last 168 hours.     INTAKE/OUTPUT: I/O last 3 completed shifts:  In: 1200 [IV Piggyback:1200]  Out: 1000 [Urine:1000]  No intake/output data recorded.       IMAGING: No results found.     A/P 1. D35.2- s/p TSH. Doing well so far. AM chemistries good. No excessive thirst. Suggest to check BMP and USG q12 as low risk for DI and if OK, can discharge from my standpoint w/in 48h of surgery. Keep following I/Os. If sodium out of range, please call me. OTW, please contact my office for 1-3 week f/u.    Thank you,    Dr. Jessica Lopez   DATE:3/29/2023

## 2023-03-29 NOTE — NURSING
Patient discharged via facility transport. Educ. Provided per AVS. Patient and family verbalizes understanding at time of departure.

## 2023-03-29 NOTE — OP NOTE
[facility]                       [address] [id number]    PATIENT NAME:      RONNA PÉREZ   YOB: 1955  CSN:               [id number]  MRN:               [id number]  ADMIT DATE:        03/28/2023 04:45:00  PHYSICIAN:         Champ Sofia MD                          OPERATIVE REPORT      DATE OF SURGERY:    03/28/2023 00:00:00    SURGEON:  Champ Sofia MD    CO-SURGEON:  Jasis Vasquez MD    PREOPERATIVE DIAGNOSIS:  Pituitary adenoma.    POSTOPERATIVE DIAGNOSIS:  Pituitary adenoma.    PROCEDURE PERFORMED:    1. Image guidance, stereotactic navigational surgery, intracranial.  2. Endoscopic transnasal transsphenoidal hypophysectomy.  3. Right-sided nasoseptal flap, pedicled local flap based on the nasal septal   artery branch of the posterior septal artery.    FINDINGS:   Consistent with pituitary adenoma.    ANESTHESIA:  General.    BLOOD LOSS:  Minimal.    COMPLICATIONS:  None.    DRAINS:  None.    DRESSINGS:  None.    PACKS:  Absorbable.    DESCRIPTION OF PROCEDURE:  The patient was taken to the operating room and   placed in the supine position.  The patient was intubated as per Anesthesia.    Once the tube was secured and the patient was anesthetized, the patient was   positioned for surgery.  He was placed in Urena head pins.  The image   guidance system was then registered to the patient's anatomy.  Once accuracy and   registration were confirmed, the image guided system was used for preoperative   planning and intraoperative navigation as deemed necessary throughout the case   so as to avoid injury to important paranasal sinus structures such as the skull   base and orbit.    At this point, the nasal cavities were then packed with Afrin-soaked cottonoids.    The septum was injected with 2 cc of 1% lidocaine with epinephrine 1:100,000.    The patient was then prepped and draped sterilely.   The right lower quadrant   was also prepped and draped sterilely for a possible fat graft.    At this juncture, I worked on the left and right side of the nose.  Endoscopic   surgery was begun with 0 and 30-degree telescopes.  Bilateral sphenopalatine   injections were then made with 2 cc of 1% lidocaine with epinephrine 1:100,000.    At this juncture, the right-sided nasoseptal flap based on the right nasal   septal artery branch of the posterior septal artery was then harvested.  A   cautery was then used to make a superior incision beginning laterally just below   the sphenoid os onto the septum superiorly approximately 1 cm below the skull   base.  The incision was carried anteriorly all the way to the anterior aspect of   the mucosal portion of the septum.  At this juncture, the inferior incision was   then made starting laterally extending just above the choana and onto the   septum extending anteriorly along the junction of the septum and floor of the   nose.  A vertical incision was then made anteriorly connecting the superior and   inferior incisions.  Once this was done, a Sinai elevator was then used to   elevate the right-sided nasal septal flap without any complications.  It was   then stored in the nasopharynx for later use.  At this junction, endoscopic   sphenoidotomies were then performed to the natural sphenoid os and exposure was   obtained superiorly, inferiorly, as well as laterally bilaterally.  Once this   was done, the posterior septectomy was then performed with backbiting punches   and the rostrum of the sphenoid was then removed with Kerrison punches.  Once   wide exposure was gained bilaterally, hemostasis was then achieved with bipolar   cautery as well as suction Bovie intranasally.  Once this was done, the inner   sinus septum was taken back to the skull base and floor of the sella.  The   mucosa of the sphenoid sinus was then removed with Blakesley forceps.  At this   juncture with  myself working to the left side and Dr. Vasquez working through the   right side of the nose, the bone of the floor of the sella was removed with   Kerrison punches exposing the dura.  A window of dura was then exposed and a   cruciate incision was then made with a micro knife per Dr. Vasquez.  The dura was   reflected and the tumor was then resected mainly from the right side of the   sella, taking great care to preserve the integrity of the normal gland.  All   visible tumor was resected with a ring curette and pituitary rongeurs in all 4   quadrants.  There was no obvious CSF leak noted.  Frozen section returned   significant for possible pituitary adenoma, wait for permanent pathology.  Once   this was done and all visible tumor was resected, the cavities were irrigated   with warm saline.  The nasoseptal flap was then placed overlying the sella to   reconstruct the skull base.  The mucosa was facing outward so as to avoid   mucocele formation.  Once the sella was reconstructed with a nasal septal flap,   fibrin glue was then placed followed by Surgiflo and Gelfoam.  Nonabsorbable   packs were deemed unnecessary.  The patient was then taken down from the   Tatum head pins.  The patient was then cleansed, awakened, extubated, and   taken to PACU in stable condition.  Please see Dr. Vasquez's note for further   details on the surgery.        ______________________________  MD NICOLE Edmondson/JER  DD:  03/28/2023  Time:  03:51PM  DT:  03/28/2023  Time:  08:10PM  Job #:  [id number]      OPERATIVE REPORT

## 2023-03-29 NOTE — PLAN OF CARE
03/29/23 1342   Discharge Assessment   Assessment Type Discharge Planning Assessment   Confirmed/corrected address, phone number and insurance Yes   Confirmed Demographics Correct on Facesheet   Source of Information patient   Reason For Admission Pituitary Adenoma   People in Home spouse   Do you expect to return to your current living situation? Yes   Do you have help at home or someone to help you manage your care at home? Yes   Who are your caregiver(s) and their phone number(s)? Spouse and fmly members   Prior to hospitilization cognitive status: Alert/Oriented   Current cognitive status: Alert/Oriented   Home Layout Able to live on 1st floor   Equipment Currently Used at Home none   Who is going to help you get home at discharge? Spouse and fmly members   Discharge Plan A Home with family   DME Needed Upon Discharge  none   Discharge Barriers Identified None     D/C order noted and MD here states no services required for d/c home.  Went to pt's room - has spouse and other fmlymembers at home who state they will provide assistance and transportation needs once home.  No needs voiced at this time.

## 2023-03-30 ENCOUNTER — NURSE TRIAGE (OUTPATIENT)
Dept: ADMINISTRATIVE | Facility: CLINIC | Age: 68
End: 2023-03-30
Payer: MEDICARE

## 2023-03-30 ENCOUNTER — PATIENT OUTREACH (OUTPATIENT)
Dept: ADMINISTRATIVE | Facility: CLINIC | Age: 68
End: 2023-03-30
Payer: MEDICARE

## 2023-03-30 LAB — PSYCHE PATHOLOGY RESULT: NORMAL

## 2023-03-30 NOTE — PROGRESS NOTES
C3 nurse spoke with Jacques Garcia wife for a TCC post hospital discharge follow up call. The patient does not have a scheduled HOSFU appointment with MIRIAN HERMAN  within 5-7 days post hospital discharge date 3/29/2023. C3 nurse was unable to schedule HOSFU appointment in [facility]. Patient wife stated patient is in a lot of pain and has a headache. Patient rated pain 10/10 and stated Percocet 10-325mg isn't helping pain. Patient wife stated pt is congested as well and pain started after patient had procedure on Tuesday. Offered to transfer patient's wife call to OCC nurse. Patient's wife agreed and verbalized understanding. Transferred call to OCC nurse for further assessment.

## 2023-03-30 NOTE — TELEPHONE ENCOUNTER
[place]    PCP:  MIRIAN New    Spoke with Wife, Brigitte Garcia, on pts behalf.  S/P excision neoplasm of pituitary gland.  C/O HA rated 10/10, nausea, mild difficulty swallowing, unable to speak, dizziness upon standing, and nasal congestion.  Wife states that the pt was able to speak upon discharge.  Discharged yesterday.  Taking Percocet and pain med ineffective.  Also using nasal spray.  Denies drainage, fever, vomiting, and SOB.  She reports pt was vomiting yesterday but no vomiting today.  Per protocol, care advised is go to the ED now.  Advised to call for worsening/questions/concerns.  VU.     Reason for Disposition   Patient sounds very sick or weak to the triager    Additional Information   Negative: Sounds like a life-threatening emergency to the triager   Negative: Bright red, wide-spread, sunburn-like rash   Negative: SEVERE headache and after spinal (epidural) anesthesia   Negative: Vomiting and persists > 4 hours   Negative: Vomiting and abdomen looks much more swollen than usual   Negative: Drinking very little and dehydration suspected (e.g., no urine > 12 hours, very dry mouth, very lightheaded)    Protocols used: Post-Op Symptoms and [id number]

## 2023-03-31 NOTE — TELEPHONE ENCOUNTER
I spoke with patient's wife this morning. Patient was home. She reported that a CT scan was done in the ED at [facility] in [place] and was normal. His blood pressure was elevated. He was given medication to lower BP in ED and given rx of phenergan for nausea. He was feeling slightly better today. I discussed this with Nataly. Nothing new to add. She will bring the CD of CT scan to follow up appt with Dr. Vasquez. They are to call will if symptoms persist, worsen or change.

## 2023-04-02 RX ORDER — ONDANSETRON HYDROCHLORIDE 8 MG/1
8 TABLET, FILM COATED ORAL EVERY 6 HOURS PRN
Qty: 30 TABLET | Refills: 1 | Status: SHIPPED | OUTPATIENT
Start: 2023-04-02 | End: 2023-07-28

## 2023-04-02 RX ORDER — KETOROLAC TROMETHAMINE 10 MG/1
10 TABLET, FILM COATED ORAL EVERY 6 HOURS PRN
Qty: 20 TABLET | Refills: 0 | Status: SHIPPED | OUTPATIENT
Start: 2023-04-02 | End: 2023-04-07

## 2023-04-10 ENCOUNTER — OFFICE VISIT (OUTPATIENT)
Dept: NEUROSURGERY | Facility: CLINIC | Age: 68
End: 2023-04-10
Payer: MEDICARE

## 2023-04-10 VITALS
HEIGHT: 65 IN | SYSTOLIC BLOOD PRESSURE: 121 MMHG | DIASTOLIC BLOOD PRESSURE: 76 MMHG | RESPIRATION RATE: 17 BRPM | BODY MASS INDEX: 28.32 KG/M2 | WEIGHT: 170 LBS | HEART RATE: 73 BPM | TEMPERATURE: 98 F

## 2023-04-10 DIAGNOSIS — D35.2 PITUITARY ADENOMA: Primary | ICD-10-CM

## 2023-04-10 PROCEDURE — 99024 PR POST-OP FOLLOW-UP VISIT: ICD-10-PCS | Mod: POP,,, | Performed by: NEUROLOGICAL SURGERY

## 2023-04-10 PROCEDURE — 99024 POSTOP FOLLOW-UP VISIT: CPT | Mod: POP,,, | Performed by: NEUROLOGICAL SURGERY

## 2023-04-10 RX ORDER — PROMETHAZINE HYDROCHLORIDE 25 MG/1
25 TABLET ORAL EVERY 4 HOURS PRN
COMMUNITY
Start: 2023-03-30 | End: 2023-07-28

## 2023-04-10 NOTE — PROGRESS NOTES
[facility]  Neurosurgery        Jacques Garcia   [id number]   1955       SUBJECTIVE:     CHIEF COMPLAINT:    2 weeks post-op    HPI:    Jacques Garcia is a 68 y.o.-year-old male who presents today for post-operative follow-up.  He is s/p transsphenoidal resection of pituitary tumor that was done on 3/28/23.  The patient complains of headaches in the frontal area and congestion.  He denies any changes in vision.  He continues to use his nasal spray as directed.  He is scheduled to follow up with Dr. Sofia on 4/17.  He finished the Toradol that was prescribed for the headaches yesterday.  He says his PCP has his regular prescription for Percocet ready for him to .  He has been on this for some time for problems with his neck.  He has not tried taking decongestants.        Review of patient's allergies indicates:   Allergen Reactions    Dilaudid [hydromorphone] Nausea And Vomiting     Current Outpatient Medications   Medication Sig Dispense Refill    albuterol (PROVENTIL/VENTOLIN HFA) 90 mcg/actuation inhaler Inhale 2 puffs into the lungs every 4 (four) hours as needed.      diphenhydrAMINE (SOMINEX) 25 mg tablet Take 25 mg by mouth nightly as needed for Insomnia.      fluticasone propionate (FLOVENT DISKUS) 50 mcg/actuation DsDv Inhale 2 puffs into the lungs 2 (two) times a day. Controller      levocetirizine (XYZAL) 5 MG tablet Take 5 mg by mouth every evening.      linaCLOtide (LINZESS) 72 mcg Cap capsule Take 72 mcg by mouth as needed.      losartan (COZAAR) 50 MG tablet Take 50 mg by mouth once daily.      metFORMIN (GLUCOPHAGE) 500 MG tablet Take 500 mg by mouth 2 (two) times daily with meals.      montelukast (SINGULAIR) 10 mg tablet Take 10 mg by mouth every evening.      neomycin-polymyxin-dexamethasone (MAXITROL) 3.5mg/mL-10,000 unit/mL-0.1 % DrpS once daily.      oxyCODONE-acetaminophen (PERCOCET)  mg per tablet Take 1 tablet by mouth every 4 (four) hours as needed for  Pain. (Patient taking differently: Take 1 tablet by mouth every 8 (eight) hours as needed for Pain.) 42 tablet 0    polyethylene glycol 400 (VISINE DRY EYE RELIEF OPHT) Apply to eye 3 (three) times daily as needed.      promethazine (PHENERGAN) 25 MG tablet Take 25 mg by mouth every 4 (four) hours as needed.      rosuvastatin (CRESTOR) 20 MG tablet Take 20 mg by mouth once daily.      tadalafiL (CIALIS) 20 MG Tab Take 20 mg by mouth once daily.      tamsulosin (FLOMAX) 0.4 mg Cap Take 1 capsule by mouth every evening.      ondansetron (ZOFRAN) 8 MG tablet Take 1 tablet (8 mg total) by mouth every 6 (six) hours as needed for Nausea. (Patient not taking: Reported on 4/10/2023) 30 tablet 1     No current facility-administered medications for this visit.     Past Medical History:   Diagnosis Date    Asthma     Benign neoplasm of pituitary gland     BPH (benign prostatic hyperplasia)     Cervical disc disorder     Dizziness     DM (diabetes mellitus)     Heartburn     History of kidney stones     HLD (hyperlipidemia)     HTN (hypertension)     Insomnia     Kidney stones     Numbness of tongue     Prostate cancer 2016    treated with radiation    SOB (shortness of breath)     at times    Spondylosis of cervical region without myelopathy or radiculopathy      Past Surgical History:   Procedure Laterality Date    CATARACT EXTRACTION Bilateral     COLONOSCOPY      EPIDURAL STEROID INJECTION INTO CERVICAL SPINE      multiple    EXCISION, NEOPLASM, PITUITARY, TRANSSPHENOIDAL APPROACH, USING COMPUTER-ASSISTED NAVIGATION N/A 3/28/2023    Procedure: EXCISION, NEOPLASM, PITUITARY, TRANSSPHENOIDAL APPROACH, USING COMPUTER-ASSISTED NAVIGATION;  Surgeon: Jassi Vasquez MD;  Location: [facility];  Service: Neurosurgery;  Laterality: N/A;  Endoscopic transnasal, thrassphenoidal pituitary resection  Stealth    EXCISION, NEOPLASM, PITUITARY, TRANSSPHENOIDAL APPROACH, USING COMPUTER-ASSISTED NAVIGATION N/A 3/28/2023    Procedure: EXCISION,  "NEOPLASM, PITUITARY, TRANSSPHENOIDAL APPROACH, USING COMPUTER-ASSISTED NAVIGATION;  Surgeon: Champ Sofia MD;  Location[facility] OR;  Service: ENT;  Laterality: N/A;    MULTIPLE TOOTH EXTRACTIONS      neck cyst       Family History       Problem Relation (Age of Onset)    Colon cancer Mother    Diabetes Mother, Father    Hypertension Father          Social History     Socioeconomic History    Marital status:    Tobacco Use    Smoking status: Never    Smokeless tobacco: Never   Substance and Sexual Activity    Alcohol use: Not Currently     Comment: occasionally    Drug use: Never    Sexual activity: Not Currently         Review of systems:    Pertinent items are noted in HPI.      OBJECTIVE:     Vital Signs  Temp: 97.7 °F (36.5 °C)  Pulse: 73  Resp: 17  BP: 121/76  Pain Score:   7  Height: 5' 5" (165.1 cm)  Weight: 77.1 kg (170 lb)  Body mass index is 28.29 kg/m².      Physical Exam:    General:  Pleasant. Well-nourished. Alert. No acute distress.    Head:  Normocephalic, without obvious abnormality, atraumatic    Lungs:   Breathing is quiet, non-lablored    Neurological:    Oriented to Person, Place, Time   Speech:  normal  Memory, cognition, and affect are appropriate.  Extraocular movements are intact.  Movements of facial expression are intact and symmetric.  Motor Strength: Moves all extremities spontaneously with good tone.  No abnormal movements seen.      Gait:  normal    Overall, Mr. Garcia is doing well although he is having more than expected headaches.  He is going to see Dr. Sofia later today.    ASSESSMENT/PLAN:     1. Pituitary adenoma  - Patient will go by Dr. Sofia's office today for evaluation  - MRI Pituitary W W/O Contrast; Future  - Follow up 3 months post op w/ MRI (after 6/27)           I, Dr. Jassi Vasquez, personally performed the services described in this documentation. All medical record entries made by the scribe, Brandy Luz RN, were at my direction and in my " presence.  I have reviewed the chart and agree that the record reflects my personal performance and is accurate and complete. Jassi Vasquez MD.  9:18 AM 04/10/2023       Jassi Vasquez MD FACS FAANS

## 2023-04-18 ENCOUNTER — LAB VISIT (OUTPATIENT)
Dept: LAB | Facility: HOSPITAL | Age: 68
End: 2023-04-18
Attending: NURSE PRACTITIONER
Payer: MEDICARE

## 2023-04-18 DIAGNOSIS — D68.62 LUPUS ANTICOAGULANT INHIBITOR SYNDROME: ICD-10-CM

## 2023-04-18 DIAGNOSIS — R79.1 ELEVATED PARTIAL THROMBOPLASTIN TIME (PTT): ICD-10-CM

## 2023-04-18 LAB
ALBUMIN SERPL-MCNC: 3.7 G/DL (ref 3.4–4.8)
ALBUMIN/GLOB SERPL: 1.2 RATIO (ref 1.1–2)
ALP SERPL-CCNC: 133 UNIT/L (ref 40–150)
ALT SERPL-CCNC: 73 UNIT/L (ref 0–55)
APTT PPP: 38.7 SECONDS (ref 23.2–33.7)
AST SERPL-CCNC: 24 UNIT/L (ref 5–34)
BASOPHILS # BLD AUTO: 0.02 X10(3)/MCL (ref 0–0.2)
BASOPHILS NFR BLD AUTO: 0.3 %
BILIRUBIN DIRECT+TOT PNL SERPL-MCNC: 0.2 MG/DL
BUN SERPL-MCNC: 11.6 MG/DL (ref 8.4–25.7)
CALCIUM SERPL-MCNC: 9.4 MG/DL (ref 8.8–10)
CHLORIDE SERPL-SCNC: 107 MMOL/L (ref 98–107)
CO2 SERPL-SCNC: 26 MMOL/L (ref 23–31)
CREAT SERPL-MCNC: 0.79 MG/DL (ref 0.73–1.18)
DRVV CONF RATIO (OHS): 0.97
DRVV NORM RATIO (OHS): 1.36 (ref 0–1.19)
DRVV SCR RATIO (OHS): 1.32
EOSINOPHIL # BLD AUTO: 0.54 X10(3)/MCL (ref 0–0.9)
EOSINOPHIL NFR BLD AUTO: 8.5 %
ERYTHROCYTE [DISTWIDTH] IN BLOOD BY AUTOMATED COUNT: 13.8 % (ref 11.5–17)
GFR SERPLBLD CREATININE-BSD FMLA CKD-EPI: >60 MLS/MIN/1.73/M2
GLOBULIN SER-MCNC: 3 GM/DL (ref 2.4–3.5)
GLUCOSE SERPL-MCNC: 108 MG/DL (ref 82–115)
HCT VFR BLD AUTO: 40.8 % (ref 42–52)
HGB BLD-MCNC: 12.6 G/DL (ref 14–18)
IMM GRANULOCYTES # BLD AUTO: 0.01 X10(3)/MCL (ref 0–0.04)
IMM GRANULOCYTES NFR BLD AUTO: 0.2 %
L.A. PATH INTERP (OHS): ABNORMAL
LA ST CALC (OHS): 4.1 SECONDS (ref 0–7.9)
LYMPHOCYTES # BLD AUTO: 1.55 X10(3)/MCL (ref 0.6–4.6)
LYMPHOCYTES NFR BLD AUTO: 24.5 %
MCH RBC QN AUTO: 26 PG (ref 27–31)
MCHC RBC AUTO-ENTMCNC: 30.9 G/DL (ref 33–36)
MCV RBC AUTO: 84.3 FL (ref 80–94)
MONOCYTES # BLD AUTO: 0.49 X10(3)/MCL (ref 0.1–1.3)
MONOCYTES NFR BLD AUTO: 7.8 %
NEUTROPHILS # BLD AUTO: 3.71 X10(3)/MCL (ref 2.1–9.2)
NEUTROPHILS NFR BLD AUTO: 58.7 %
PLATELET # BLD AUTO: 365 X10(3)/MCL (ref 130–400)
PMV BLD AUTO: 8.8 FL (ref 7.4–10.4)
POTASSIUM SERPL-SCNC: 4.1 MMOL/L (ref 3.5–5.1)
PROT SERPL-MCNC: 6.7 GM/DL (ref 5.8–7.6)
RBC # BLD AUTO: 4.84 X10(6)/MCL (ref 4.7–6.1)
SODIUM SERPL-SCNC: 141 MMOL/L (ref 136–145)
TT IMM BOVINE THROMBIN PPP: 16 SECONDS (ref 0–22)
WBC # SPEC AUTO: 6.3 X10(3)/MCL (ref 4.5–11.5)

## 2023-04-18 PROCEDURE — 85613 RUSSELL VIPER VENOM DILUTED: CPT

## 2023-04-18 PROCEDURE — 85670 THROMBIN TIME PLASMA: CPT

## 2023-04-18 PROCEDURE — 85025 COMPLETE CBC W/AUTO DIFF WBC: CPT

## 2023-04-18 PROCEDURE — 85730 THROMBOPLASTIN TIME PARTIAL: CPT

## 2023-04-18 PROCEDURE — 80053 COMPREHEN METABOLIC PANEL: CPT

## 2023-04-18 PROCEDURE — 36415 COLL VENOUS BLD VENIPUNCTURE: CPT

## 2023-04-20 NOTE — PROGRESS NOTES
Subjective:       Patient ID: Jacques Garcia is a 68 y.o. male.    Chief Complaint: Follow Up    Diagnosis:   Lupus Anticoagulant   PTT Prolongation due to above inhibitor    Current Treatment: None    Treatment History: N/A    HPI:  69yo M who presents for evaluation of prolonged PTT. Patient was planning to undergo surgery for resection of a nonfunctioning pituitary tumor in January 2022 when preoperative labs revealed coagulation studies of a normal PT and INR, however a prolonged PTT at 40 seconds. He was referred to hematology for further evaluation.     Today he has no complaints. He has never had any bleeding, bruising, or clotting disorders. He has never had surgery before. Denies any prolonged bleeding incidents after minor trauma or dental exams. No family history of hematolologic issues. He is in good health. Denies any unintentional weight loss. Denies any OTC medication use other than what is prescribed. No hepatic dysfunction he is aware of.    Interval History:  Patient presents to clinic for 12 week MD follow up appointment to discuss lab results.   He underwent surgery with Dr. Vasquez successfully, had no issues  Denies any bleeding, bruising, history of blood clot, stroke, or MI  Overall doing well with no complaints today.       Past Medical History:   Diagnosis Date    Asthma     Benign neoplasm of pituitary gland     BPH (benign prostatic hyperplasia)     Cervical disc disorder     Dizziness     DM (diabetes mellitus)     Heartburn     History of kidney stones     HLD (hyperlipidemia)     HTN (hypertension)     Insomnia     Kidney stones     Numbness of tongue     Prostate cancer 2016    treated with radiation    SOB (shortness of breath)     at times    Spondylosis of cervical region without myelopathy or radiculopathy       Past Surgical History:   Procedure Laterality Date    CATARACT EXTRACTION Bilateral     COLONOSCOPY      EPIDURAL STEROID INJECTION INTO CERVICAL SPINE      multiple     EXCISION, NEOPLASM, PITUITARY, TRANSSPHENOIDAL APPROACH, USING COMPUTER-ASSISTED NAVIGATION N/A 3/28/2023    Procedure: EXCISION, NEOPLASM, PITUITARY, TRANSSPHENOIDAL APPROACH, USING COMPUTER-ASSISTED NAVIGATION;  Surgeon: Jassi Vasquez MD;  Location: [facility] OR;  Service: Neurosurgery;  Laterality: N/A;  Endoscopic transnasal, thrassphenoidal pituitary resection  Stealth    EXCISION, NEOPLASM, PITUITARY, TRANSSPHENOIDAL APPROACH, USING COMPUTER-ASSISTED NAVIGATION N/A 3/28/2023    Procedure: EXCISION, NEOPLASM, PITUITARY, TRANSSPHENOIDAL APPROACH, USING COMPUTER-ASSISTED NAVIGATION;  Surgeon: Champ Sofia MD;  Location: [facility] OR;  Service: ENT;  Laterality: N/A;    MULTIPLE TOOTH EXTRACTIONS      neck cyst       Social History     Socioeconomic History    Marital status:    Tobacco Use    Smoking status: Never    Smokeless tobacco: Never   Substance and Sexual Activity    Alcohol use: Not Currently     Comment: occasionally    Drug use: Never    Sexual activity: Not Currently      Family History   Problem Relation Age of Onset    Diabetes Mother     Colon cancer Mother     Hypertension Father     Diabetes Father       Review of patient's allergies indicates:   Allergen Reactions    Dilaudid [hydromorphone] Nausea And Vomiting      Review of Systems   Constitutional:  Negative for activity change, appetite change, chills, fatigue and fever.   HENT:  Negative for sore throat.    Eyes:  Negative for visual disturbance.   Respiratory:  Negative for cough and shortness of breath.    Cardiovascular:  Negative for chest pain.   Gastrointestinal:  Negative for abdominal pain, constipation, diarrhea, nausea and vomiting.   Endocrine: Negative for polyuria.   Genitourinary:  Negative for dysuria.   Musculoskeletal:  Negative for back pain.   Integumentary:  Negative for rash.   Allergic/Immunologic: Negative for frequent infections.   Neurological:  Negative for weakness and headaches.   Hematological:  Negative for  adenopathy. Does not bruise/bleed easily.       Objective:      Vitals:    04/24/23 1110   BP: (!) 147/88   Pulse: 60   Resp: 16   Temp: 97.7 °F (36.5 °C)         Physical Exam  Constitutional:       General: He is not in acute distress.     Appearance: Normal appearance. He is not ill-appearing.   HENT:      Head: Normocephalic and atraumatic.      Nose: Nose normal.      Mouth/Throat:      Mouth: Mucous membranes are moist.      Pharynx: Oropharynx is clear.   Eyes:      Extraocular Movements: Extraocular movements intact.      Conjunctiva/sclera: Conjunctivae normal.      Pupils: Pupils are equal, round, and reactive to light.   Cardiovascular:      Rate and Rhythm: Normal rate and regular rhythm.      Pulses: Normal pulses.      Heart sounds: Normal heart sounds. No murmur heard.  Pulmonary:      Effort: Pulmonary effort is normal. No respiratory distress.      Breath sounds: Normal breath sounds.   Abdominal:      General: There is no distension.      Palpations: Abdomen is soft.      Tenderness: There is no abdominal tenderness.   Musculoskeletal:         General: Normal range of motion.      Cervical back: Normal range of motion and neck supple.      Right lower leg: No edema.      Left lower leg: No edema.   Lymphadenopathy:      Cervical: No cervical adenopathy.   Skin:     General: Skin is warm and dry.   Neurological:      General: No focal deficit present.      Mental Status: He is alert and oriented to person, place, and time.       LABS AND IMAGING REVIEWED IN EPIC    Component      Latest Ref Rng & Units 1/9/2023   Protime      12.5 - 14.5 seconds 13.0   INR      0.00 - 1.30 0.99     Component      Latest Ref Rng & Units 1/9/2023   aPTT      23.2 - 33.7 seconds 40.9 (H)     Component      Latest Ref Rng & Units 1/23/2023           1:46 PM   PTT Base Patient      23.2 - 33.7 seconds 39.7 (H)   PTT Base PNP      seconds 27.9   PTT 1:1 Initial      seconds 33.3   PTT Incub Patient      seconds 51.1   PTT  Incub PNP      seconds 36.4   PTT 1:1 Together 60      seconds 40.3   PTT Joan Index       31.2   PTT Percent Correct      % 54.2   Mix Interp       MIXING STUDY:  The mixing study findings are suggestive of a factor inhibitor. . . .     Component      Latest Ref Rng & Units 1/25/2023   Factor IX      59 - 191 % 126   Factor 11 and 12 Activity Normal    Component      Latest Ref Rng & Units 1/23/2023   DRVV Scr Ratio       44.70   DRVV Conf Ratio       32.30   DRVV Norm Ratio      0.00 - 1.19 1.38 (H)   Lupus Anticoag ST Calc      0.0 - 7.9 seconds 1.6   Interpretative Report for Lupus Anticoagulant Interpretation:  Lupus anticoagulant present.   Component      Latest Ref Rng & Units 4/18/2023   DRVV Scr Ratio       1.32   DRVV Conf Ratio       0.97   DRVV Norm Ratio      0.00 - 1.19 1.36 (H)   Lupus Anticoag ST Calc      0.0 - 7.9 seconds 4.1   Lupus Anticoagulant Interp       Interpretative Report for Lupus Anticoagulant . . .     Assessment:   Lupus Anticoagulant - No history of thrombosis, this was discovered with preoperative labs showing prolonged PTT in January '22. Mixing study without evidence of correction indicating presence of inhibitor. Lupus anticoagulant positive. No evidence of other Factor deficiency or inhibitor presence. With this he is at not at increased risk of bleeding, but more so increased risk of thrombosis. For this reason I recommend he start anticoagulation, preferably with LMWH as soon as possible after surgery and continue until he is fully ambulatory. Given he has no history of thrombosis there is no role for chronic anticoagulation at this time.    Tolerated surgery well. Lupus anticoagulant still present 12 weeks apart. But, given no history of thrombosis there is no role for anticoagulation at this time. Discussed prolonged immobilization and traveling precautions moving forward.     Plan:       - Can start aspirin 81mg daily  - No role for anticoagulation given no history of  thrombosis  - RTC 3 months for MD visit, labs 1 week prior  - Precautions discussed in detail    I spent a total of 35 minutes on the day of the visit.This includes face to face time and non-face to face time preparing to see the patient (eg, review of tests), obtaining and/or reviewing separately obtained history, documenting clinical information in the electronic or other health record, independently interpreting results and communicating results to the patient/family/caregiver, or care coordinator.        Elizabeth Kennedy LeJeune, MD  Hematology/Oncology   [facility]

## 2023-04-24 ENCOUNTER — OFFICE VISIT (OUTPATIENT)
Dept: HEMATOLOGY/ONCOLOGY | Facility: CLINIC | Age: 68
End: 2023-04-24
Payer: MEDICARE

## 2023-04-24 VITALS
WEIGHT: 170.19 LBS | DIASTOLIC BLOOD PRESSURE: 88 MMHG | BODY MASS INDEX: 28.36 KG/M2 | OXYGEN SATURATION: 98 % | HEIGHT: 65 IN | TEMPERATURE: 98 F | RESPIRATION RATE: 16 BRPM | SYSTOLIC BLOOD PRESSURE: 147 MMHG | HEART RATE: 60 BPM

## 2023-04-24 DIAGNOSIS — R79.1 ELEVATED PARTIAL THROMBOPLASTIN TIME (PTT): Primary | ICD-10-CM

## 2023-04-24 DIAGNOSIS — D68.62 LUPUS ANTICOAGULANT INHIBITOR SYNDROME: ICD-10-CM

## 2023-04-24 PROCEDURE — 99999 PR PBB SHADOW E&M-EST. PATIENT-LVL IV: ICD-10-PCS | Mod: PBBFAC,,, | Performed by: STUDENT IN AN ORGANIZED HEALTH CARE EDUCATION/TRAINING PROGRAM

## 2023-04-24 PROCEDURE — 99214 PR OFFICE/OUTPT VISIT, EST, LEVL IV, 30-39 MIN: ICD-10-PCS | Mod: S$PBB,,, | Performed by: STUDENT IN AN ORGANIZED HEALTH CARE EDUCATION/TRAINING PROGRAM

## 2023-04-24 PROCEDURE — 99214 OFFICE O/P EST MOD 30 MIN: CPT | Mod: S$PBB,,, | Performed by: STUDENT IN AN ORGANIZED HEALTH CARE EDUCATION/TRAINING PROGRAM

## 2023-04-24 PROCEDURE — 99999 PR PBB SHADOW E&M-EST. PATIENT-LVL IV: CPT | Mod: PBBFAC,,, | Performed by: STUDENT IN AN ORGANIZED HEALTH CARE EDUCATION/TRAINING PROGRAM

## 2023-04-24 PROCEDURE — 99214 OFFICE O/P EST MOD 30 MIN: CPT | Mod: PBBFAC | Performed by: STUDENT IN AN ORGANIZED HEALTH CARE EDUCATION/TRAINING PROGRAM

## 2023-07-18 ENCOUNTER — LAB VISIT (OUTPATIENT)
Dept: LAB | Facility: HOSPITAL | Age: 68
End: 2023-07-18
Attending: STUDENT IN AN ORGANIZED HEALTH CARE EDUCATION/TRAINING PROGRAM
Payer: MEDICARE

## 2023-07-18 DIAGNOSIS — D68.62 LUPUS ANTICOAGULANT INHIBITOR SYNDROME: ICD-10-CM

## 2023-07-18 DIAGNOSIS — R79.1 ELEVATED PARTIAL THROMBOPLASTIN TIME (PTT): ICD-10-CM

## 2023-07-18 LAB
ALBUMIN SERPL-MCNC: 3.8 G/DL (ref 3.4–4.8)
ALBUMIN/GLOB SERPL: 1.5 RATIO (ref 1.1–2)
ALP SERPL-CCNC: 74 UNIT/L (ref 40–150)
ALT SERPL-CCNC: 20 UNIT/L (ref 0–55)
APTT PPP: 35.8 SECONDS (ref 23.2–33.7)
AST SERPL-CCNC: 16 UNIT/L (ref 5–34)
BASOPHILS # BLD AUTO: 0.02 X10(3)/MCL
BASOPHILS NFR BLD AUTO: 0.4 %
BILIRUBIN DIRECT+TOT PNL SERPL-MCNC: 0.3 MG/DL
BUN SERPL-MCNC: 18.1 MG/DL (ref 8.4–25.7)
CALCIUM SERPL-MCNC: 8.8 MG/DL (ref 8.8–10)
CHLORIDE SERPL-SCNC: 111 MMOL/L (ref 98–107)
CO2 SERPL-SCNC: 23 MMOL/L (ref 23–31)
CREAT SERPL-MCNC: 0.87 MG/DL (ref 0.73–1.18)
EOSINOPHIL # BLD AUTO: 0.4 X10(3)/MCL (ref 0–0.9)
EOSINOPHIL NFR BLD AUTO: 7.9 %
ERYTHROCYTE [DISTWIDTH] IN BLOOD BY AUTOMATED COUNT: 14.7 % (ref 11.5–17)
GFR SERPLBLD CREATININE-BSD FMLA CKD-EPI: >60 MLS/MIN/1.73/M2
GLOBULIN SER-MCNC: 2.6 GM/DL (ref 2.4–3.5)
GLUCOSE SERPL-MCNC: 127 MG/DL (ref 82–115)
HCT VFR BLD AUTO: 37.8 % (ref 42–52)
HGB BLD-MCNC: 11.5 G/DL (ref 14–18)
IMM GRANULOCYTES # BLD AUTO: 0 X10(3)/MCL (ref 0–0.04)
IMM GRANULOCYTES NFR BLD AUTO: 0 %
LYMPHOCYTES # BLD AUTO: 1.37 X10(3)/MCL (ref 0.6–4.6)
LYMPHOCYTES NFR BLD AUTO: 27 %
MCH RBC QN AUTO: 25.9 PG (ref 27–31)
MCHC RBC AUTO-ENTMCNC: 30.4 G/DL (ref 33–36)
MCV RBC AUTO: 85.1 FL (ref 80–94)
MONOCYTES # BLD AUTO: 0.55 X10(3)/MCL (ref 0.1–1.3)
MONOCYTES NFR BLD AUTO: 10.8 %
NEUTROPHILS # BLD AUTO: 2.73 X10(3)/MCL (ref 2.1–9.2)
NEUTROPHILS NFR BLD AUTO: 53.9 %
PLATELET # BLD AUTO: 267 X10(3)/MCL (ref 130–400)
PMV BLD AUTO: 10.1 FL (ref 7.4–10.4)
POTASSIUM SERPL-SCNC: 4.6 MMOL/L (ref 3.5–5.1)
PROT SERPL-MCNC: 6.4 GM/DL (ref 5.8–7.6)
RBC # BLD AUTO: 4.44 X10(6)/MCL (ref 4.7–6.1)
SODIUM SERPL-SCNC: 141 MMOL/L (ref 136–145)
TT IMM BOVINE THROMBIN PPP: 16 SECONDS (ref 0–22)
WBC # SPEC AUTO: 5.07 X10(3)/MCL (ref 4.5–11.5)

## 2023-07-18 PROCEDURE — 85730 THROMBOPLASTIN TIME PARTIAL: CPT

## 2023-07-18 PROCEDURE — 80053 COMPREHEN METABOLIC PANEL: CPT

## 2023-07-18 PROCEDURE — 85613 RUSSELL VIPER VENOM DILUTED: CPT

## 2023-07-18 PROCEDURE — 85670 THROMBIN TIME PLASMA: CPT

## 2023-07-18 PROCEDURE — 36415 COLL VENOUS BLD VENIPUNCTURE: CPT

## 2023-07-18 PROCEDURE — 85025 COMPLETE CBC W/AUTO DIFF WBC: CPT

## 2023-07-21 NOTE — PROGRESS NOTES
Subjective:       Patient ID: Jacques Garcia is a 68 y.o. male.    Chief Complaint: Follow Up    Diagnosis:   Lupus Anticoagulant   PTT Prolongation due to above inhibitor    Current Treatment:   Aspiring 81 mg daily - April '23    Treatment History: N/A    HPI:  67yo M who presents for evaluation of prolonged PTT. Patient was planning to undergo surgery for resection of a nonfunctioning pituitary tumor in January 2022 when preoperative labs revealed coagulation studies of a normal PT and INR, however a prolonged PTT at 40 seconds. He was referred to hematology for further evaluation.     Today he has no complaints. He has never had any bleeding, bruising, or clotting disorders. He has never had surgery before. Denies any prolonged bleeding incidents after minor trauma or dental exams. No family history of hematolologic issues. He is in good health. Denies any unintentional weight loss. Denies any OTC medication use other than what is prescribed. No hepatic dysfunction he is aware of.    Interval History:  Patient presents to clinic today for 3 month MD follow up appointment with labs prior to discuss results.   He continues to do well. Denies any blood clots, MI, CVA's since last visit.   Denies any bleeding, bruising, or other issues.   Discussed his lab results in detail - continued presence of Lupus anticoag Ab.     Past Medical History:   Diagnosis Date    Asthma     Benign neoplasm of pituitary gland     BPH (benign prostatic hyperplasia)     Cervical disc disorder     Dizziness     DM (diabetes mellitus)     Heartburn     History of kidney stones     HLD (hyperlipidemia)     HTN (hypertension)     Insomnia     Kidney stones     Numbness of tongue     Prostate cancer 2016    treated with radiation    SOB (shortness of breath)     at times    Spondylosis of cervical region without myelopathy or radiculopathy       Past Surgical History:   Procedure Laterality Date    CATARACT EXTRACTION Bilateral      COLONOSCOPY      EPIDURAL STEROID INJECTION INTO CERVICAL SPINE      multiple    EXCISION, NEOPLASM, PITUITARY, TRANSSPHENOIDAL APPROACH, USING COMPUTER-ASSISTED NAVIGATION N/A 3/28/2023    Procedure: EXCISION, NEOPLASM, PITUITARY, TRANSSPHENOIDAL APPROACH, USING COMPUTER-ASSISTED NAVIGATION;  Surgeon: Jassi Vasquez MD;  Location: [facility] OR;  Service: Neurosurgery;  Laterality: N/A;  Endoscopic transnasal, thrassphenoidal pituitary resection  Stealth    EXCISION, NEOPLASM, PITUITARY, TRANSSPHENOIDAL APPROACH, USING COMPUTER-ASSISTED NAVIGATION N/A 3/28/2023    Procedure: EXCISION, NEOPLASM, PITUITARY, TRANSSPHENOIDAL APPROACH, USING COMPUTER-ASSISTED NAVIGATION;  Surgeon: Champ Sofia MD;  Location: [facility] OR;  Service: ENT;  Laterality: N/A;    MULTIPLE TOOTH EXTRACTIONS      neck cyst       Social History     Socioeconomic History    Marital status:    Tobacco Use    Smoking status: Never    Smokeless tobacco: Never   Substance and Sexual Activity    Alcohol use: Not Currently     Comment: occasionally    Drug use: Never    Sexual activity: Not Currently      Family History   Problem Relation Age of Onset    Diabetes Mother     Colon cancer Mother     Hypertension Father     Diabetes Father       Review of patient's allergies indicates:   Allergen Reactions    Dilaudid [hydromorphone] Nausea And Vomiting      Review of Systems   Constitutional:  Negative for activity change, appetite change, chills, fatigue and fever.   HENT:  Negative for sore throat.    Eyes:  Negative for visual disturbance.   Respiratory:  Negative for cough and shortness of breath.    Cardiovascular:  Negative for chest pain.   Gastrointestinal:  Negative for abdominal pain, constipation, diarrhea, nausea and vomiting.   Endocrine: Negative for polyuria.   Genitourinary:  Negative for dysuria.   Musculoskeletal:  Negative for back pain.   Integumentary:  Negative for rash.   Allergic/Immunologic: Negative for frequent infections.    Neurological:  Negative for weakness and headaches.   Hematological:  Negative for adenopathy. Does not bruise/bleed easily.       Objective:      Vitals:    07/25/23 1251   BP: (!) 149/81   Pulse: (!) 59   Temp: 98 °F (36.7 °C)       Physical Exam  Constitutional:       General: He is not in acute distress.     Appearance: Normal appearance. He is not ill-appearing.   HENT:      Head: Normocephalic and atraumatic.      Nose: Nose normal.      Mouth/Throat:      Mouth: Mucous membranes are moist.      Pharynx: Oropharynx is clear.   Eyes:      Extraocular Movements: Extraocular movements intact.      Conjunctiva/sclera: Conjunctivae normal.      Pupils: Pupils are equal, round, and reactive to light.   Cardiovascular:      Rate and Rhythm: Normal rate and regular rhythm.      Pulses: Normal pulses.      Heart sounds: Normal heart sounds. No murmur heard.  Pulmonary:      Effort: Pulmonary effort is normal. No respiratory distress.      Breath sounds: Normal breath sounds.   Abdominal:      General: There is no distension.      Palpations: Abdomen is soft.      Tenderness: There is no abdominal tenderness.   Musculoskeletal:         General: Normal range of motion.      Cervical back: Normal range of motion and neck supple.      Right lower leg: No edema.      Left lower leg: No edema.   Lymphadenopathy:      Cervical: No cervical adenopathy.   Skin:     General: Skin is warm and dry.   Neurological:      General: No focal deficit present.      Mental Status: He is alert and oriented to person, place, and time.       LABS AND IMAGING REVIEWED IN EPIC    Component      Latest Ref Rng & Units 1/9/2023   Protime      12.5 - 14.5 seconds 13.0   INR      0.00 - 1.30 0.99     Component      Latest Ref Rng & Units 1/9/2023   aPTT      23.2 - 33.7 seconds 40.9 (H)     Component      Latest Ref Rng & Units 1/23/2023           1:46 PM   PTT Base Patient      23.2 - 33.7 seconds 39.7 (H)   PTT Base PNP      seconds 27.9   PTT  1:1 Initial      seconds 33.3   PTT Incub Patient      seconds 51.1   PTT Incub PNP      seconds 36.4   PTT 1:1 Together 60      seconds 40.3   PTT Ojan Index       31.2   PTT Percent Correct      % 54.2   Mix Interp       MIXING STUDY:  The mixing study findings are suggestive of a factor inhibitor. . . .     Component      Latest Ref Rng & Units 1/25/2023   Factor IX      59 - 191 % 126   Factor 11 and 12 Activity Normal    Component      Latest Ref Rng & Units 1/23/2023   DRVV Scr Ratio       44.70   DRVV Conf Ratio       32.30   DRVV Norm Ratio      0.00 - 1.19 1.38 (H)   Lupus Anticoag ST Calc      0.0 - 7.9 seconds 1.6   Interpretative Report for Lupus Anticoagulant Interpretation:  Lupus anticoagulant present.   Component      Latest Ref Rng & Units 4/18/2023   DRVV Scr Ratio       1.32   DRVV Conf Ratio       0.97   DRVV Norm Ratio      0.00 - 1.19 1.36 (H)   Lupus Anticoag ST Calc      0.0 - 7.9 seconds 4.1   Lupus Anticoagulant Interp       Interpretative Report for Lupus Anticoagulant . . .     Assessment:   Lupus Anticoagulant - No history of thrombosis, this was discovered with preoperative labs showing prolonged PTT in January '22. Mixing study without evidence of correction indicating presence of inhibitor. Lupus anticoagulant positive. No evidence of other Factor deficiency or inhibitor presence. With this he is at not at increased risk of bleeding, but more so increased risk of thrombosis. For this reason I recommend he start anticoagulation, preferably with LMWH as soon as possible after surgery and continue until he is fully ambulatory. Given he has no history of thrombosis there is no role for chronic anticoagulation at this time.    Tolerated surgery well. Lupus anticoagulant still present 12 weeks apart. But, given no history of thrombosis there is no role for anticoagulation at this time. Discussed prolonged immobilization and traveling precautions moving forward.     Plan:       - Can start  aspirin 81mg daily  - No role for anticoagulation given no history of thrombosis  - RTC 3 months for MD visit, labs 1 week prior  - Precautions discussed in detail    I spent a total of 35 minutes on the day of the visit.This includes face to face time and non-face to face time preparing to see the patient (eg, review of tests), obtaining and/or reviewing separately obtained history, documenting clinical information in the electronic or other health record, independently interpreting results and communicating results to the patient/family/caregiver, or care coordinator.    Elizabeth Kennedy LeJeune, MD  Hematology/Oncology   [facility]

## 2023-07-25 ENCOUNTER — OFFICE VISIT (OUTPATIENT)
Dept: HEMATOLOGY/ONCOLOGY | Facility: CLINIC | Age: 68
End: 2023-07-25
Payer: MEDICARE

## 2023-07-25 VITALS
SYSTOLIC BLOOD PRESSURE: 149 MMHG | WEIGHT: 172.81 LBS | TEMPERATURE: 98 F | HEART RATE: 59 BPM | BODY MASS INDEX: 28.76 KG/M2 | DIASTOLIC BLOOD PRESSURE: 81 MMHG | OXYGEN SATURATION: 99 %

## 2023-07-25 DIAGNOSIS — D68.62 LUPUS ANTICOAGULANT INHIBITOR SYNDROME: Primary | ICD-10-CM

## 2023-07-25 DIAGNOSIS — R79.1 ELEVATED PARTIAL THROMBOPLASTIN TIME (PTT): ICD-10-CM

## 2023-07-25 LAB
DRVV CONF RATIO (OHS): 1.03
DRVV NORM RATIO (OHS): 1.34 (ref 0–1.19)
DRVV SCR RATIO (OHS): 1.38
L.A. PATH INTERP (OHS): ABNORMAL
LA ST CALC (OHS): 1.8 SECONDS (ref 0–7.9)

## 2023-07-25 PROCEDURE — 99214 OFFICE O/P EST MOD 30 MIN: CPT | Mod: S$PBB,,, | Performed by: STUDENT IN AN ORGANIZED HEALTH CARE EDUCATION/TRAINING PROGRAM

## 2023-07-25 PROCEDURE — 99214 OFFICE O/P EST MOD 30 MIN: CPT | Mod: PBBFAC | Performed by: STUDENT IN AN ORGANIZED HEALTH CARE EDUCATION/TRAINING PROGRAM

## 2023-07-25 PROCEDURE — 99214 PR OFFICE/OUTPT VISIT, EST, LEVL IV, 30-39 MIN: ICD-10-PCS | Mod: S$PBB,,, | Performed by: STUDENT IN AN ORGANIZED HEALTH CARE EDUCATION/TRAINING PROGRAM

## 2023-07-25 PROCEDURE — 99999 PR PBB SHADOW E&M-EST. PATIENT-LVL IV: ICD-10-PCS | Mod: PBBFAC,,, | Performed by: STUDENT IN AN ORGANIZED HEALTH CARE EDUCATION/TRAINING PROGRAM

## 2023-07-25 PROCEDURE — 99999 PR PBB SHADOW E&M-EST. PATIENT-LVL IV: CPT | Mod: PBBFAC,,, | Performed by: STUDENT IN AN ORGANIZED HEALTH CARE EDUCATION/TRAINING PROGRAM

## 2023-07-28 ENCOUNTER — APPOINTMENT (OUTPATIENT)
Dept: RADIOLOGY | Facility: HOSPITAL | Age: 68
End: 2023-07-28
Attending: NEUROLOGICAL SURGERY
Payer: MEDICARE

## 2023-07-28 ENCOUNTER — OFFICE VISIT (OUTPATIENT)
Dept: NEUROSURGERY | Facility: CLINIC | Age: 68
End: 2023-07-28
Payer: MEDICARE

## 2023-07-28 VITALS
HEIGHT: 65 IN | WEIGHT: 167 LBS | SYSTOLIC BLOOD PRESSURE: 128 MMHG | HEART RATE: 68 BPM | DIASTOLIC BLOOD PRESSURE: 75 MMHG | BODY MASS INDEX: 27.82 KG/M2 | RESPIRATION RATE: 16 BRPM

## 2023-07-28 DIAGNOSIS — D35.2 PITUITARY ADENOMA: Primary | ICD-10-CM

## 2023-07-28 DIAGNOSIS — D35.2 PITUITARY ADENOMA: ICD-10-CM

## 2023-07-28 PROCEDURE — 99213 PR OFFICE/OUTPT VISIT, EST, LEVL III, 20-29 MIN: ICD-10-PCS | Mod: ,,, | Performed by: NEUROLOGICAL SURGERY

## 2023-07-28 PROCEDURE — 99213 OFFICE O/P EST LOW 20 MIN: CPT | Mod: ,,, | Performed by: NEUROLOGICAL SURGERY

## 2023-07-28 PROCEDURE — 25500020 PHARM REV CODE 255: Performed by: NEUROLOGICAL SURGERY

## 2023-07-28 PROCEDURE — A9577 INJ MULTIHANCE: HCPCS | Performed by: NEUROLOGICAL SURGERY

## 2023-07-28 PROCEDURE — 70553 MRI BRAIN STEM W/O & W/DYE: CPT | Mod: TC

## 2023-07-28 RX ADMIN — GADOBENATE DIMEGLUMINE 15 ML: 529 INJECTION, SOLUTION INTRAVENOUS at 09:07

## 2023-07-28 NOTE — PROGRESS NOTES
[facility]  Neurosurgery        Jacques Garcia   [id number]   1955       SUBJECTIVE:     CHIEF COMPLAINT:    4 months post-op    HPI:    Jacques Garcia is a 68 y.o.-year-old male who presents today for post-operative follow-up.  He is s/p transsphenoidal resection of pituitary tumor that was done on 3/28/23.  The headaches he experienced after surgery have improved.  He is scheduled to follow up with Dr. Lopez on 8/2.  His labs in mid April after the surgery were normal.  He continues to have some sinus issues off and on, but says it is not bad.  He has followed up with Dr. Sofia and says he has released him.  He denies any other new or worsening symptoms.        Review of patient's allergies indicates:   Allergen Reactions    Dilaudid [hydromorphone] Nausea And Vomiting     Current Outpatient Medications   Medication Sig Dispense Refill    albuterol (PROVENTIL/VENTOLIN HFA) 90 mcg/actuation inhaler Inhale 2 puffs into the lungs every 4 (four) hours as needed.      diphenhydrAMINE (SOMINEX) 25 mg tablet Take 25 mg by mouth nightly as needed for Insomnia.      fluticasone propionate (FLOVENT DISKUS) 50 mcg/actuation DsDv Inhale 2 puffs into the lungs 2 (two) times a day. Controller      levocetirizine (XYZAL) 5 MG tablet Take 5 mg by mouth every evening.      linaCLOtide (LINZESS) 72 mcg Cap capsule Take 72 mcg by mouth as needed.      losartan (COZAAR) 50 MG tablet Take 50 mg by mouth once daily.      metFORMIN (GLUCOPHAGE) 500 MG tablet Take 500 mg by mouth 2 (two) times daily with meals.      neomycin-polymyxin-dexamethasone (MAXITROL) 3.5mg/mL-10,000 unit/mL-0.1 % DrpS once daily.      oxyCODONE-acetaminophen (PERCOCET)  mg per tablet Take 1 tablet by mouth every 4 (four) hours as needed for Pain. (Patient taking differently: Take 1 tablet by mouth every 8 (eight) hours as needed for Pain.) 42 tablet 0    polyethylene glycol 400 (VISINE DRY EYE RELIEF OPHT) Apply to eye 3 (three)  times daily as needed.      rosuvastatin (CRESTOR) 20 MG tablet Take 20 mg by mouth once daily.      tadalafiL (CIALIS) 20 MG Tab Take 20 mg by mouth once daily.      tamsulosin (FLOMAX) 0.4 mg Cap Take 1 capsule by mouth every evening.       No current facility-administered medications for this visit.     Past Medical History:   Diagnosis Date    Asthma     Benign neoplasm of pituitary gland     BPH (benign prostatic hyperplasia)     Cervical disc disorder     Dizziness     DM (diabetes mellitus)     Heartburn     History of kidney stones     HLD (hyperlipidemia)     HTN (hypertension)     Insomnia     Kidney stones     Numbness of tongue     Prostate cancer 2016    treated with radiation    SOB (shortness of breath)     at times    Spondylosis of cervical region without myelopathy or radiculopathy      Past Surgical History:   Procedure Laterality Date    CATARACT EXTRACTION Bilateral     COLONOSCOPY      EPIDURAL STEROID INJECTION INTO CERVICAL SPINE      multiple    EXCISION, NEOPLASM, PITUITARY, TRANSSPHENOIDAL APPROACH, USING COMPUTER-ASSISTED NAVIGATION N/A 3/28/2023    Procedure: EXCISION, NEOPLASM, PITUITARY, TRANSSPHENOIDAL APPROACH, USING COMPUTER-ASSISTED NAVIGATION;  Surgeon: Jassi Vasquez MD;  Location: [facility];  Service: Neurosurgery;  Laterality: N/A;  Endoscopic transnasal, thrassphenoidal pituitary resection  Stealth    EXCISION, NEOPLASM, PITUITARY, TRANSSPHENOIDAL APPROACH, USING COMPUTER-ASSISTED NAVIGATION N/A 3/28/2023    Procedure: EXCISION, NEOPLASM, PITUITARY, TRANSSPHENOIDAL APPROACH, USING COMPUTER-ASSISTED NAVIGATION;  Surgeon: Champ Sofia MD;  Location: [facility] OR;  Service: ENT;  Laterality: N/A;    MULTIPLE TOOTH EXTRACTIONS      neck cyst       Family History       Problem Relation (Age of Onset)    Colon cancer Mother    Diabetes Mother, Father    Hypertension Father          Social History     Socioeconomic History    Marital status:    Tobacco Use    Smoking status:  "Never    Smokeless tobacco: Never   Substance and Sexual Activity    Alcohol use: Not Currently     Comment: occasionally    Drug use: Never    Sexual activity: Not Currently         Review of systems:    Pertinent items are noted in HPI.      OBJECTIVE:     Vital Signs  Pulse: 68  Resp: 16  BP: 128/75  Pain Score: 0-No pain  Height: 5' 5" (165.1 cm)  Weight: 75.8 kg (167 lb)  Body mass index is 27.79 kg/m².      Physical Exam:    General:  Pleasant. Well-nourished. Alert. No acute distress.    Head:  Normocephalic, without obvious abnormality, atraumatic    Lungs:   Breathing is quiet, non-lablored    Neurological:    Oriented to Person, Place, Time   Speech:  normal  Memory, cognition, and affect are appropriate.  Extraocular movements are intact.  Movements of facial expression are intact and symmetric.  Motor Strength: Moves all extremities spontaneously with good tone.  No abnormal movements seen.      Gait:  normal    Three-month postop MRI shows no obvious residual/recurrent tumor.    ASSESSMENT/PLAN:     1. Pituitary adenoma  - MRI Pituitary W W/O Contrast; Future  - Follow up 1 year post op w/ MRI         I, Dr. Jassi Vasquez, personally performed the services described in this documentation. All medical record entries made by the scribe, Brandy Luz RN, were at my direction and in my presence.  I have reviewed the chart and agree that the record reflects my personal performance and is accurate and complete. Jassi Vasquez MD.  9:18 AM 07/28/2023       Jassi Vasquez MD FACS [facility]       "

## 2023-08-16 ENCOUNTER — TELEPHONE (OUTPATIENT)
Dept: HEMATOLOGY/ONCOLOGY | Facility: CLINIC | Age: 68
End: 2023-08-16
Payer: MEDICARE

## 2023-08-16 NOTE — TELEPHONE ENCOUNTER
Brandy with Dr. Hager (pain management physician) office calling to check status of clearance that was faxed over.

## 2023-08-16 NOTE — TELEPHONE ENCOUNTER
It was faxed over on 8/10/23. I tried calling her back, but had to leave message. I left fax number and asked that she resend request.

## 2024-01-17 ENCOUNTER — TELEPHONE (OUTPATIENT)
Dept: HEMATOLOGY/ONCOLOGY | Facility: CLINIC | Age: 69
End: 2024-01-17
Payer: MEDICARE

## 2024-01-17 DIAGNOSIS — D68.62 LUPUS ANTICOAGULANT INHIBITOR SYNDROME: ICD-10-CM

## 2024-01-17 DIAGNOSIS — R79.1 ELEVATED PARTIAL THROMBOPLASTIN TIME (PTT): Primary | ICD-10-CM

## 2024-01-18 ENCOUNTER — LAB VISIT (OUTPATIENT)
Dept: LAB | Facility: HOSPITAL | Age: 69
End: 2024-01-18
Attending: NURSE PRACTITIONER
Payer: MEDICARE

## 2024-01-18 DIAGNOSIS — R79.1 ELEVATED PARTIAL THROMBOPLASTIN TIME (PTT): ICD-10-CM

## 2024-01-18 DIAGNOSIS — D68.62 LUPUS ANTICOAGULANT INHIBITOR SYNDROME: ICD-10-CM

## 2024-01-18 LAB
ALBUMIN SERPL-MCNC: 4 G/DL (ref 3.4–4.8)
ALBUMIN/GLOB SERPL: 1.5 RATIO (ref 1.1–2)
ALP SERPL-CCNC: 72 UNIT/L (ref 40–150)
ALT SERPL-CCNC: 48 UNIT/L (ref 0–55)
APTT PPP: 36.5 SECONDS (ref 23.2–33.7)
AST SERPL-CCNC: 30 UNIT/L (ref 5–34)
BASOPHILS # BLD AUTO: 0.02 X10(3)/MCL
BASOPHILS NFR BLD AUTO: 0.3 %
BILIRUB SERPL-MCNC: 0.3 MG/DL
BUN SERPL-MCNC: 11 MG/DL (ref 8.4–25.7)
CALCIUM SERPL-MCNC: 9.3 MG/DL (ref 8.8–10)
CHLORIDE SERPL-SCNC: 108 MMOL/L (ref 98–107)
CO2 SERPL-SCNC: 25 MMOL/L (ref 23–31)
CREAT SERPL-MCNC: 0.8 MG/DL (ref 0.73–1.18)
EOSINOPHIL # BLD AUTO: 0.68 X10(3)/MCL (ref 0–0.9)
EOSINOPHIL NFR BLD AUTO: 9.5 %
ERYTHROCYTE [DISTWIDTH] IN BLOOD BY AUTOMATED COUNT: 14.4 % (ref 11.5–17)
GFR SERPLBLD CREATININE-BSD FMLA CKD-EPI: >60 MLS/MIN/1.73/M2
GLOBULIN SER-MCNC: 2.6 GM/DL (ref 2.4–3.5)
GLUCOSE SERPL-MCNC: 115 MG/DL (ref 82–115)
HCT VFR BLD AUTO: 40.4 % (ref 42–52)
HGB BLD-MCNC: 13 G/DL (ref 14–18)
IMM GRANULOCYTES # BLD AUTO: 0 X10(3)/MCL (ref 0–0.04)
IMM GRANULOCYTES NFR BLD AUTO: 0 %
LYMPHOCYTES # BLD AUTO: 1.8 X10(3)/MCL (ref 0.6–4.6)
LYMPHOCYTES NFR BLD AUTO: 25.2 %
MCH RBC QN AUTO: 26.7 PG (ref 27–31)
MCHC RBC AUTO-ENTMCNC: 32.2 G/DL (ref 33–36)
MCV RBC AUTO: 83.1 FL (ref 80–94)
MONOCYTES # BLD AUTO: 0.63 X10(3)/MCL (ref 0.1–1.3)
MONOCYTES NFR BLD AUTO: 8.8 %
NEUTROPHILS # BLD AUTO: 4.02 X10(3)/MCL (ref 2.1–9.2)
NEUTROPHILS NFR BLD AUTO: 56.2 %
PLATELET # BLD AUTO: 282 X10(3)/MCL (ref 130–400)
PMV BLD AUTO: 10.2 FL (ref 7.4–10.4)
POTASSIUM SERPL-SCNC: 4.1 MMOL/L (ref 3.5–5.1)
PROT SERPL-MCNC: 6.6 GM/DL (ref 5.8–7.6)
RBC # BLD AUTO: 4.86 X10(6)/MCL (ref 4.7–6.1)
SODIUM SERPL-SCNC: 141 MMOL/L (ref 136–145)
TT IMM BOVINE THROMBIN PPP: 18 SECONDS (ref 0–22)
WBC # SPEC AUTO: 7.15 X10(3)/MCL (ref 4.5–11.5)

## 2024-01-18 PROCEDURE — 36415 COLL VENOUS BLD VENIPUNCTURE: CPT

## 2024-01-18 PROCEDURE — 85730 THROMBOPLASTIN TIME PARTIAL: CPT

## 2024-01-18 PROCEDURE — 85670 THROMBIN TIME PLASMA: CPT

## 2024-01-18 PROCEDURE — 85730 THROMBOPLASTIN TIME PARTIAL: CPT | Mod: 91

## 2024-01-18 PROCEDURE — 80053 COMPREHEN METABOLIC PANEL: CPT

## 2024-01-18 PROCEDURE — 85613 RUSSELL VIPER VENOM DILUTED: CPT

## 2024-01-18 PROCEDURE — 85025 COMPLETE CBC W/AUTO DIFF WBC: CPT

## 2024-01-23 LAB
APTT PPP: 28 SECONDS
APTT PPP: 31.8 SECONDS
APTT PPP: 34.5 SECONDS
APTT PPP: 36.9 SECONDS
APTT PPP: 42.2 SECONDS (ref 23.2–33.7)
APTT PPP: 48.1 SECONDS
DRVV CONF RATIO (OHS): 0.84
DRVV NORM RATIO (OHS): 1.27 (ref 0–1.19)
DRVV SCR RATIO (OHS): 1.07
L.A. PATH INTERP (OHS): ABNORMAL
LA ST CALC (OHS): 3.9 SECONDS (ref 0–7.9)
MIXING STUDIES PPP-IMP: ABNORMAL
PROTHROMBIN TIME: 12.4 SECONDS (ref 12.5–14.5)
PROTHROMBIN TIME: 12.5 SECONDS (ref 12.2–14.7)
PROTHROMBIN TIME: 12.8 SECONDS
PROTHROMBIN TIME: 13 SECONDS
PROTHROMBIN TIME: 14.2 SECONDS
PT BASELINE PNP (OHS): 12.9 SECONDS
PT PERCENT CORRECT (OHS): -25 %
PT ROSNER INDEX (OHS): -0.8
PTT PERCENT CORRECT (OHS): 54.2 %
PTT ROSNER INDEX (OHS): 21.1

## 2024-01-30 NOTE — PROGRESS NOTES
Subjective:       Patient ID: Jacques Garcia is a 69 y.o. male.    Chief Complaint: Follow Up    Diagnosis:   Lupus Anticoagulant   PTT Prolongation due to above inhibitor    Current Treatment:   Aspirin 81 mg daily - April '23    Treatment History: N/A    HPI:  69yo M who presents for evaluation of prolonged PTT. Patient was planning to undergo surgery for resection of a nonfunctioning pituitary tumor in January 2022 when preoperative labs revealed coagulation studies of a normal PT and INR, however a prolonged PTT at 40 seconds. He was referred to hematology for further evaluation.     He has never had any bleeding, bruising, or clotting disorders. He has never had surgery before. Denies any prolonged bleeding incidents after minor trauma or dental exams. No family history of hematolologic issues. He is in good health. Denies any unintentional weight loss. Denies any OTC medication use other than what is prescribed. No hepatic dysfunction he is aware of.    Interval History:  Patient presents to clinic today for 6 month NP follow up appointment with labs prior to discuss results.   He continues to do well. Denies any blood clots, MI, CVA's since last visit.   Denies any bleeding, bruising, or other issues.   Discussed his lab results in detail - continued presence of Lupus anticoag Ab.     Past Medical History:   Diagnosis Date    Asthma     Benign neoplasm of pituitary gland     BPH (benign prostatic hyperplasia)     Cervical disc disorder     Dizziness     DM (diabetes mellitus)     Heartburn     History of kidney stones     HLD (hyperlipidemia)     HTN (hypertension)     Insomnia     Kidney stones     Numbness of tongue     Prostate cancer 2016    treated with radiation    SOB (shortness of breath)     at times    Spondylosis of cervical region without myelopathy or radiculopathy       Past Surgical History:   Procedure Laterality Date    CATARACT EXTRACTION Bilateral     COLONOSCOPY      EPIDURAL STEROID  INJECTION INTO CERVICAL SPINE      multiple    EXCISION, NEOPLASM, PITUITARY, TRANSSPHENOIDAL APPROACH, USING COMPUTER-ASSISTED NAVIGATION N/A 3/28/2023    Procedure: EXCISION, NEOPLASM, PITUITARY, TRANSSPHENOIDAL APPROACH, USING COMPUTER-ASSISTED NAVIGATION;  Surgeon: Jassi Vasquez MD;  Location: [facility] OR;  Service: Neurosurgery;  Laterality: N/A;  Endoscopic transnasal, thrassphenoidal pituitary resection  Stealth    EXCISION, NEOPLASM, PITUITARY, TRANSSPHENOIDAL APPROACH, USING COMPUTER-ASSISTED NAVIGATION N/A 3/28/2023    Procedure: EXCISION, NEOPLASM, PITUITARY, TRANSSPHENOIDAL APPROACH, USING COMPUTER-ASSISTED NAVIGATION;  Surgeon: Champ Sofia MD;  Location: [facility] OR;  Service: ENT;  Laterality: N/A;    MULTIPLE TOOTH EXTRACTIONS      neck cyst       Social History     Socioeconomic History    Marital status:    Tobacco Use    Smoking status: Never    Smokeless tobacco: Never   Substance and Sexual Activity    Alcohol use: Not Currently     Comment: occasionally    Drug use: Never    Sexual activity: Not Currently      Family History   Problem Relation Age of Onset    Diabetes Mother     Colon cancer Mother     Hypertension Father     Diabetes Father       Review of patient's allergies indicates:   Allergen Reactions    Dilaudid [hydromorphone] Nausea And Vomiting      Review of Systems   Constitutional:  Negative for activity change, appetite change, chills, fatigue and fever.   HENT:  Negative for sore throat.    Eyes:  Negative for visual disturbance.   Respiratory:  Negative for cough and shortness of breath.    Cardiovascular:  Negative for chest pain.   Gastrointestinal:  Negative for abdominal pain, constipation, diarrhea, nausea and vomiting.   Endocrine: Negative for polyuria.   Genitourinary:  Negative for dysuria.   Musculoskeletal:  Negative for back pain.   Integumentary:  Negative for rash.   Allergic/Immunologic: Negative for frequent infections.   Neurological:  Negative for  weakness and headaches.   Hematological:  Negative for adenopathy. Does not bruise/bleed easily.         Objective:      Vitals:    01/31/24 1031   BP: 132/80   Pulse: 87   Resp: 14   Temp: 97.7 °F (36.5 °C)     Physical Exam  Constitutional:       General: He is not in acute distress.     Appearance: Normal appearance. He is not ill-appearing.   HENT:      Head: Normocephalic and atraumatic.      Nose: Nose normal.      Mouth/Throat:      Mouth: Mucous membranes are moist.      Pharynx: Oropharynx is clear.   Eyes:      Extraocular Movements: Extraocular movements intact.      Conjunctiva/sclera: Conjunctivae normal.      Pupils: Pupils are equal, round, and reactive to light.   Cardiovascular:      Rate and Rhythm: Normal rate and regular rhythm.      Pulses: Normal pulses.      Heart sounds: Normal heart sounds. No murmur heard.  Pulmonary:      Effort: Pulmonary effort is normal. No respiratory distress.      Breath sounds: Normal breath sounds.   Abdominal:      General: There is no distension.      Palpations: Abdomen is soft.      Tenderness: There is no abdominal tenderness.   Musculoskeletal:         General: Normal range of motion.      Cervical back: Normal range of motion and neck supple.      Right lower leg: No edema.      Left lower leg: No edema.   Lymphadenopathy:      Cervical: No cervical adenopathy.   Skin:     General: Skin is warm and dry.   Neurological:      General: No focal deficit present.      Mental Status: He is alert and oriented to person, place, and time.         LABS AND IMAGING REVIEWED IN EPIC    Component      Latest Ref Rng & Units 1/9/2023   Protime      12.5 - 14.5 seconds 13.0   INR      0.00 - 1.30 0.99       Component      Latest Ref Rng 4/18/2023 7/18/2023 1/18/2024   aPTT      23.2 - 33.7 seconds 38.7 (H)  35.8 (H)  36.5 (H)       Legend:  (H) High    Component      Latest Ref Rng & Units 1/25/2023   Factor IX      59 - 191 % 126   Factor 11 and 12 Activity  Normal    Interpretative Report for Lupus Anticoagulant Interpretation:  Lupus anticoagulant present.   Component      Latest Ref Rng 7/18/2023 1/18/2024   DRVV Scr Ratio 1.38  1.07    DRVV Conf Ratio 1.03  0.84    DRVV Norm Ratio      0.00 - 1.19  1.34 (H)  1.27 (H)    Lupus Anticoag ST Calc      0.0 - 7.9 seconds 1.8  3.9       Legend:  (H) High  Assessment:   Lupus Anticoagulant - No history of thrombosis, this was discovered with preoperative labs showing prolonged PTT in January '22. Mixing study without evidence of correction indicating presence of inhibitor. Lupus anticoagulant positive. No evidence of other Factor deficiency or inhibitor presence. With this he is at not at increased risk of bleeding, but more so increased risk of thrombosis. For this reason I recommend he start anticoagulation, preferably with LMWH as soon as possible after surgery and continue until he is fully ambulatory. Given he has no history of thrombosis there is no role for chronic anticoagulation at this time.    Tolerated surgery well. Lupus anticoagulant still present 12 weeks apart. But, given no history of thrombosis there is no role for anticoagulation at this time. Discussed prolonged immobilization and traveling precautions moving forward.     Plan:       - Continue aspirin 81mg daily  - No role for anticoagulation given no history of thrombosis  - Follow up with Endocrine for abnormal TSH  - RTC 6 months for NP visit, labs 1 week prior  - Precautions discussed in detail    I spent a total of 35 minutes on the day of the visit.This includes face to face time and non-face to face time preparing to see the patient (eg, review of tests), obtaining and/or reviewing separately obtained history, documenting clinical information in the electronic or other health record, independently interpreting results and communicating results to the patient/family/caregiver, or care coordinator.    Natasha Knox  Hematology/Oncology   [place]  [facility]

## 2024-01-31 ENCOUNTER — OFFICE VISIT (OUTPATIENT)
Dept: HEMATOLOGY/ONCOLOGY | Facility: CLINIC | Age: 69
End: 2024-01-31
Payer: MEDICARE

## 2024-01-31 VITALS
RESPIRATION RATE: 14 BRPM | BODY MASS INDEX: 30.02 KG/M2 | OXYGEN SATURATION: 96 % | TEMPERATURE: 98 F | HEART RATE: 87 BPM | SYSTOLIC BLOOD PRESSURE: 132 MMHG | WEIGHT: 180.19 LBS | DIASTOLIC BLOOD PRESSURE: 80 MMHG | HEIGHT: 65 IN

## 2024-01-31 DIAGNOSIS — D68.62 LUPUS ANTICOAGULANT INHIBITOR SYNDROME: Primary | ICD-10-CM

## 2024-01-31 DIAGNOSIS — R79.1 ELEVATED PARTIAL THROMBOPLASTIN TIME (PTT): ICD-10-CM

## 2024-01-31 PROCEDURE — 99999 PR PBB SHADOW E&M-EST. PATIENT-LVL IV: CPT | Mod: PBBFAC,,,

## 2024-01-31 PROCEDURE — 99215 OFFICE O/P EST HI 40 MIN: CPT | Mod: S$PBB,,,

## 2024-01-31 PROCEDURE — 99214 OFFICE O/P EST MOD 30 MIN: CPT | Mod: PBBFAC

## 2024-03-15 ENCOUNTER — APPOINTMENT (OUTPATIENT)
Dept: RADIOLOGY | Facility: HOSPITAL | Age: 69
End: 2024-03-15
Attending: NEUROLOGICAL SURGERY
Payer: MEDICARE

## 2024-03-15 DIAGNOSIS — D35.2 PITUITARY ADENOMA: ICD-10-CM

## 2024-03-15 PROCEDURE — 70553 MRI BRAIN STEM W/O & W/DYE: CPT | Mod: TC

## 2024-03-15 PROCEDURE — 25500020 PHARM REV CODE 255: Performed by: NEUROLOGICAL SURGERY

## 2024-03-15 PROCEDURE — A9577 INJ MULTIHANCE: HCPCS | Performed by: NEUROLOGICAL SURGERY

## 2024-03-15 RX ADMIN — GADOBENATE DIMEGLUMINE 15 ML: 529 INJECTION, SOLUTION INTRAVENOUS at 11:03

## 2024-03-20 ENCOUNTER — OFFICE VISIT (OUTPATIENT)
Dept: NEUROSURGERY | Facility: CLINIC | Age: 69
End: 2024-03-20
Payer: MEDICARE

## 2024-03-20 VITALS
BODY MASS INDEX: 30.22 KG/M2 | SYSTOLIC BLOOD PRESSURE: 133 MMHG | DIASTOLIC BLOOD PRESSURE: 79 MMHG | WEIGHT: 181.38 LBS | HEIGHT: 65 IN | HEART RATE: 58 BPM | RESPIRATION RATE: 16 BRPM

## 2024-03-20 DIAGNOSIS — D35.2 PITUITARY ADENOMA: Primary | ICD-10-CM

## 2024-03-20 PROCEDURE — 99213 OFFICE O/P EST LOW 20 MIN: CPT | Mod: ,,, | Performed by: NEUROLOGICAL SURGERY

## 2024-03-20 RX ORDER — MELOXICAM 15 MG/1
15 TABLET ORAL
COMMUNITY
Start: 2024-03-10

## 2024-03-20 RX ORDER — PREDNISONE 20 MG/1
20 TABLET ORAL
COMMUNITY
Start: 2023-12-20

## 2024-03-20 RX ORDER — GABAPENTIN 100 MG/1
100 CAPSULE ORAL 3 TIMES DAILY
COMMUNITY
Start: 2024-03-17

## 2024-03-20 RX ORDER — LOSARTAN POTASSIUM 100 MG/1
100 TABLET ORAL
COMMUNITY
Start: 2024-03-11

## 2024-03-20 RX ORDER — KETOROLAC TROMETHAMINE 10 MG/1
10 TABLET, FILM COATED ORAL EVERY 6 HOURS PRN
COMMUNITY
Start: 2023-12-20 | End: 2024-03-20

## 2024-03-20 RX ORDER — BENZONATATE 200 MG/1
200 CAPSULE ORAL
COMMUNITY
Start: 2023-11-28

## 2024-03-20 RX ORDER — SILDENAFIL 100 MG/1
100 TABLET, FILM COATED ORAL
COMMUNITY
Start: 2024-01-30

## 2024-03-20 RX ORDER — EZETIMIBE 10 MG/1
10 TABLET ORAL
COMMUNITY
Start: 2024-01-31

## 2024-03-20 RX ORDER — CEFUROXIME AXETIL 500 MG/1
500 TABLET ORAL EVERY 12 HOURS
COMMUNITY
Start: 2023-12-20 | End: 2024-03-20

## 2024-03-20 RX ORDER — ROSUVASTATIN CALCIUM 40 MG/1
40 TABLET, COATED ORAL
COMMUNITY
Start: 2024-03-10

## 2024-03-20 RX ORDER — LEVOTHYROXINE SODIUM 50 UG/1
TABLET ORAL
COMMUNITY
Start: 2024-02-05

## 2024-03-20 RX ORDER — TIZANIDINE 2 MG/1
2 TABLET ORAL
COMMUNITY
Start: 2023-11-06

## 2024-03-20 RX ORDER — HYDROCODONE BITARTRATE AND ACETAMINOPHEN 7.5; 325 MG/1; MG/1
1 TABLET ORAL
COMMUNITY
Start: 2024-02-09 | End: 2024-03-20

## 2024-03-20 NOTE — PROGRESS NOTES
[facility]  Neurosurgery        Jacques Garcia   [id number]   1955       SUBJECTIVE:     CHIEF COMPLAINT:    1 year post-op    HPI:    Jacques Garcia is a 69 y.o.-year-old male who presents today for post-operative follow-up.  He is s/p transsphenoidal resection of pituitary tumor that was done on 3/28/23.  He continues regular follow-ups with Dr. Lopez and his labs remain stable.  He is doing well overall.  He denies any changes in vision or other symptoms currently.        Review of patient's allergies indicates:   Allergen Reactions    Dilaudid [hydromorphone] Nausea And Vomiting     Current Outpatient Medications   Medication Sig Dispense Refill    albuterol (PROVENTIL/VENTOLIN HFA) 90 mcg/actuation inhaler Inhale 2 puffs into the lungs every 4 (four) hours as needed.      benzonatate (TESSALON) 200 MG capsule Take 200 mg by mouth.      CALCIUM CITRATE-VITAMIN D3 ORAL BID      diphenhydrAMINE (SOMINEX) 25 mg tablet Take 25 mg by mouth nightly as needed for Insomnia.      ezetimibe (ZETIA) 10 mg tablet Take 10 mg by mouth.      fluticasone propionate (FLOVENT DISKUS) 50 mcg/actuation DsDv Inhale 2 puffs into the lungs 2 (two) times a day. Controller      gabapentin (NEURONTIN) 100 MG capsule Take 100 mg by mouth 3 (three) times daily.      levocetirizine (XYZAL) 5 MG tablet Take 5 mg by mouth every evening.      levothyroxine (SYNTHROID) 50 MCG tablet TAKE 1 TABLET BY MOUTH IN THE MORNING ON AN EMPTY STOMACH      linaCLOtide (LINZESS) 72 mcg Cap capsule Take 72 mcg by mouth as needed.      losartan (COZAAR) 100 MG tablet Take 100 mg by mouth.      meloxicam (MOBIC) 15 MG tablet Take 15 mg by mouth.      metFORMIN (GLUCOPHAGE) 500 MG tablet Take 500 mg by mouth 2 (two) times daily with meals.      neomycin-polymyxin-dexamethasone (MAXITROL) 3.5mg/mL-10,000 unit/mL-0.1 % DrpS once daily.      polyethylene glycol 400 (VISINE DRY EYE RELIEF OPHT) Apply to eye 3 (three) times daily as needed.       predniSONE (DELTASONE) 20 MG tablet Take 20 mg by mouth.      rosuvastatin (CRESTOR) 40 MG Tab Take 40 mg by mouth.      sildenafiL (VIAGRA) 100 MG tablet Take 100 mg by mouth.      tamsulosin (FLOMAX) 0.4 mg Cap Take 1 capsule by mouth every evening.      tiZANidine (ZANAFLEX) 2 MG tablet Take 2 mg by mouth.       No current facility-administered medications for this visit.     Past Medical History:   Diagnosis Date    Asthma     Benign neoplasm of pituitary gland     BPH (benign prostatic hyperplasia)     Cervical disc disorder     Dizziness     DM (diabetes mellitus)     Heartburn     History of kidney stones     HLD (hyperlipidemia)     HTN (hypertension)     Insomnia     Kidney stones     Numbness of tongue     Prostate cancer 2016    treated with radiation    SOB (shortness of breath)     at times    Spondylosis of cervical region without myelopathy or radiculopathy      Past Surgical History:   Procedure Laterality Date    CATARACT EXTRACTION Bilateral     COLONOSCOPY      EPIDURAL STEROID INJECTION INTO CERVICAL SPINE      multiple    EXCISION, NEOPLASM, PITUITARY, TRANSSPHENOIDAL APPROACH, USING COMPUTER-ASSISTED NAVIGATION N/A 3/28/2023    Procedure: EXCISION, NEOPLASM, PITUITARY, TRANSSPHENOIDAL APPROACH, USING COMPUTER-ASSISTED NAVIGATION;  Surgeon: Jassi Vasquez MD;  Location: [facility];  Service: Neurosurgery;  Laterality: N/A;  Endoscopic transnasal, thrassphenoidal pituitary resection  [facility]    EXCISION, NEOPLASM, PITUITARY, TRANSSPHENOIDAL APPROACH, USING COMPUTER-ASSISTED NAVIGATION N/A 3/28/2023    Procedure: EXCISION, NEOPLASM, PITUITARY, TRANSSPHENOIDAL APPROACH, USING COMPUTER-ASSISTED NAVIGATION;  Surgeon: Champ Sofia MD;  Location: [facility] OR;  Service: ENT;  Laterality: N/A;    MULTIPLE TOOTH EXTRACTIONS      neck cyst       Family History       Problem Relation (Age of Onset)    Colon cancer Mother    Diabetes Mother, Father    Hypertension Father          Social History  "    Socioeconomic History    Marital status:    Tobacco Use    Smoking status: Never    Smokeless tobacco: Never   Substance and Sexual Activity    Alcohol use: Not Currently     Comment: occasionally    Drug use: Never    Sexual activity: Not Currently         Review of systems:    Pertinent items are noted in HPI.      OBJECTIVE:     Vital Signs  Pulse: (!) 58  Resp: 16  BP: 133/79  Pain Score: 0-No pain  Height: 5' 5" (165.1 cm)  Weight: 82.3 kg (181 lb 6.4 oz)  Body mass index is 30.19 kg/m².      Physical Exam:    General:  Pleasant. Well-nourished. Alert. No acute distress.    Head:  Normocephalic, without obvious abnormality, atraumatic    Lungs:   Breathing is quiet, non-lablored    Neurological:    Oriented to Person, Place, Time   Speech:  normal  Memory, cognition, and affect are appropriate.  Extraocular movements are intact.  Movements of facial expression are intact and symmetric.  Motor Strength: Moves all extremities spontaneously with good tone.  No abnormal movements seen.      Gait:  normal    1 year postop MRI from03/15/2024 shows no obvious residual/recurrent tumor.    ASSESSMENT/PLAN:     1. Pituitary adenoma  - MRI Pituitary W W/O Contrast; Future  - Follow up 2 years post op w/ MRI (sx 3/28/23)        I, Dr. Jassi Vasquez, personally performed the services described in this documentation. All medical record entries made by the scribe, Brandy Luz RN, were at my direction and in my presence.  I have reviewed the chart and agree that the record reflects my personal performance and is accurate and complete. Jassi Vasquez MD.  9:18 AM 03/20/2024       Jassi Vasquez MD FACS [facility]       "

## 2024-07-31 ENCOUNTER — LAB VISIT (OUTPATIENT)
Dept: LAB | Facility: HOSPITAL | Age: 69
End: 2024-07-31
Attending: STUDENT IN AN ORGANIZED HEALTH CARE EDUCATION/TRAINING PROGRAM
Payer: MEDICARE

## 2024-07-31 DIAGNOSIS — D68.62 LUPUS ANTICOAGULANT INHIBITOR SYNDROME: ICD-10-CM

## 2024-07-31 DIAGNOSIS — R79.1 ELEVATED PARTIAL THROMBOPLASTIN TIME (PTT): ICD-10-CM

## 2024-07-31 LAB
ALBUMIN SERPL-MCNC: 4 G/DL (ref 3.4–4.8)
ALBUMIN/GLOB SERPL: 1.7 RATIO (ref 1.1–2)
ALP SERPL-CCNC: 63 UNIT/L (ref 40–150)
ALT SERPL-CCNC: 18 UNIT/L (ref 0–55)
ANION GAP SERPL CALC-SCNC: 7 MEQ/L
APTT PPP: 34.7 SECONDS (ref 23.2–33.7)
AST SERPL-CCNC: 16 UNIT/L (ref 5–34)
BASOPHILS # BLD AUTO: 0.03 X10(3)/MCL
BASOPHILS NFR BLD AUTO: 0.5 %
BILIRUB SERPL-MCNC: 0.5 MG/DL
BUN SERPL-MCNC: 13.2 MG/DL (ref 8.4–25.7)
CALCIUM SERPL-MCNC: 9.2 MG/DL (ref 8.8–10)
CHLORIDE SERPL-SCNC: 110 MMOL/L (ref 98–107)
CO2 SERPL-SCNC: 26 MMOL/L (ref 23–31)
CREAT SERPL-MCNC: 0.88 MG/DL (ref 0.73–1.18)
CREAT/UREA NIT SERPL: 15
EOSINOPHIL # BLD AUTO: 0.57 X10(3)/MCL (ref 0–0.9)
EOSINOPHIL NFR BLD AUTO: 10.2 %
ERYTHROCYTE [DISTWIDTH] IN BLOOD BY AUTOMATED COUNT: 14.7 % (ref 11.5–17)
GFR SERPLBLD CREATININE-BSD FMLA CKD-EPI: >60 ML/MIN/1.73/M2
GLOBULIN SER-MCNC: 2.4 GM/DL (ref 2.4–3.5)
GLUCOSE SERPL-MCNC: 107 MG/DL (ref 82–115)
HCT VFR BLD AUTO: 39.8 % (ref 42–52)
HGB BLD-MCNC: 12.8 G/DL (ref 14–18)
IMM GRANULOCYTES # BLD AUTO: 0.01 X10(3)/MCL (ref 0–0.04)
IMM GRANULOCYTES NFR BLD AUTO: 0.2 %
LYMPHOCYTES # BLD AUTO: 1.55 X10(3)/MCL (ref 0.6–4.6)
LYMPHOCYTES NFR BLD AUTO: 27.8 %
MCH RBC QN AUTO: 26.7 PG (ref 27–31)
MCHC RBC AUTO-ENTMCNC: 32.2 G/DL (ref 33–36)
MCV RBC AUTO: 82.9 FL (ref 80–94)
MONOCYTES # BLD AUTO: 0.55 X10(3)/MCL (ref 0.1–1.3)
MONOCYTES NFR BLD AUTO: 9.9 %
NEUTROPHILS # BLD AUTO: 2.87 X10(3)/MCL (ref 2.1–9.2)
NEUTROPHILS NFR BLD AUTO: 51.4 %
PLATELET # BLD AUTO: 242 X10(3)/MCL (ref 130–400)
PMV BLD AUTO: 10.3 FL (ref 7.4–10.4)
POTASSIUM SERPL-SCNC: 4.5 MMOL/L (ref 3.5–5.1)
PROT SERPL-MCNC: 6.4 GM/DL (ref 5.8–7.6)
RBC # BLD AUTO: 4.8 X10(6)/MCL (ref 4.7–6.1)
SODIUM SERPL-SCNC: 143 MMOL/L (ref 136–145)
TT IMM BOVINE THROMBIN PPP: 17 SECONDS (ref 0–22)
WBC # BLD AUTO: 5.58 X10(3)/MCL (ref 4.5–11.5)

## 2024-07-31 PROCEDURE — 85613 RUSSELL VIPER VENOM DILUTED: CPT

## 2024-07-31 PROCEDURE — 36415 COLL VENOUS BLD VENIPUNCTURE: CPT

## 2024-07-31 PROCEDURE — 85025 COMPLETE CBC W/AUTO DIFF WBC: CPT

## 2024-07-31 PROCEDURE — 80053 COMPREHEN METABOLIC PANEL: CPT

## 2024-07-31 PROCEDURE — 85730 THROMBOPLASTIN TIME PARTIAL: CPT

## 2024-07-31 PROCEDURE — 85670 THROMBIN TIME PLASMA: CPT

## 2024-08-06 LAB
DRVV CONF RATIO (OHS): 0.97
DRVV NORM RATIO (OHS): 1.38 (ref 0–1.19)
DRVV SCR RATIO (OHS): 1.34
L.A. PATH INTERP (OHS): ABNORMAL
LA ST CALC (OHS): 2.6 SECONDS (ref 0–7.9)

## 2024-08-14 ENCOUNTER — OFFICE VISIT (OUTPATIENT)
Dept: HEMATOLOGY/ONCOLOGY | Facility: CLINIC | Age: 69
End: 2024-08-14
Payer: MEDICARE

## 2024-08-14 VITALS
TEMPERATURE: 98 F | HEART RATE: 61 BPM | BODY MASS INDEX: 29.12 KG/M2 | OXYGEN SATURATION: 98 % | WEIGHT: 174.81 LBS | DIASTOLIC BLOOD PRESSURE: 68 MMHG | SYSTOLIC BLOOD PRESSURE: 118 MMHG | HEIGHT: 65 IN | RESPIRATION RATE: 18 BRPM

## 2024-08-14 DIAGNOSIS — D64.9 ANEMIA, UNSPECIFIED TYPE: ICD-10-CM

## 2024-08-14 DIAGNOSIS — R79.1 ELEVATED PARTIAL THROMBOPLASTIN TIME (PTT): Primary | ICD-10-CM

## 2024-08-14 DIAGNOSIS — D68.62 LUPUS ANTICOAGULANT INHIBITOR SYNDROME: ICD-10-CM

## 2024-08-14 PROCEDURE — 99214 OFFICE O/P EST MOD 30 MIN: CPT | Mod: PBBFAC

## 2024-08-14 PROCEDURE — 99213 OFFICE O/P EST LOW 20 MIN: CPT | Mod: S$PBB,,,

## 2024-08-14 PROCEDURE — 99999 PR PBB SHADOW E&M-EST. PATIENT-LVL IV: CPT | Mod: PBBFAC,,,

## 2024-08-14 NOTE — PROGRESS NOTES
Subjective:       Patient ID: Jacques Garcia is a 69 y.o. male.    Chief Complaint: Follow Up    Diagnosis:   Lupus Anticoagulant   PTT Prolongation due to above inhibitor    Current Treatment:   Aspirin 81 mg daily - April '23    Treatment History: N/A    HPI:  69yo M who presents for evaluation of prolonged PTT. Patient was planning to undergo surgery for resection of a nonfunctioning pituitary tumor in January 2022 when preoperative labs revealed coagulation studies of a normal PT and INR, however a prolonged PTT at 40 seconds. He was referred to hematology for further evaluation.     He has never had any bleeding, bruising, or clotting disorders. He has never had surgery before. Denies any prolonged bleeding incidents after minor trauma or dental exams. No family history of hematolologic issues. He is in good health. Denies any unintentional weight loss. Denies any OTC medication use other than what is prescribed. No hepatic dysfunction he is aware of.    Interval History:  Patient presents to clinic today for 6 month NP follow up appointment with labs prior to discuss results.   He continues to do well.   Denies any blood clots, MI, CVA's since last visit.   Denies any bleeding, bruising, or other issues.   Discussed his lab results in detail - continued presence of Lupus anticoag Ab.     Past Medical History:   Diagnosis Date    Asthma     Benign neoplasm of pituitary gland     BPH (benign prostatic hyperplasia)     Cervical disc disorder     Dizziness     DM (diabetes mellitus)     Heartburn     History of kidney stones     HLD (hyperlipidemia)     HTN (hypertension)     Insomnia     Kidney stones     Numbness of tongue     Prostate cancer 2016    treated with radiation    SOB (shortness of breath)     at times    Spondylosis of cervical region without myelopathy or radiculopathy       Past Surgical History:   Procedure Laterality Date    CATARACT EXTRACTION Bilateral     COLONOSCOPY      EPIDURAL STEROID  INJECTION INTO CERVICAL SPINE      multiple    EXCISION, NEOPLASM, PITUITARY, TRANSSPHENOIDAL APPROACH, USING COMPUTER-ASSISTED NAVIGATION N/A 3/28/2023    Procedure: EXCISION, NEOPLASM, PITUITARY, TRANSSPHENOIDAL APPROACH, USING COMPUTER-ASSISTED NAVIGATION;  Surgeon: Jassi Vasquez MD;  Location: [facility] OR;  Service: Neurosurgery;  Laterality: N/A;  Endoscopic transnasal, thrassphenoidal pituitary resection  [facility]alth    EXCISION, NEOPLASM, PITUITARY, TRANSSPHENOIDAL APPROACH, USING COMPUTER-ASSISTED NAVIGATION N/A 3/28/2023    Procedure: EXCISION, NEOPLASM, PITUITARY, TRANSSPHENOIDAL APPROACH, USING COMPUTER-ASSISTED NAVIGATION;  Surgeon: Champ Sofia MD;  Location: [facility] OR;  Service: ENT;  Laterality: N/A;    MULTIPLE TOOTH EXTRACTIONS      neck cyst       Social History     Socioeconomic History    Marital status:    Tobacco Use    Smoking status: Never    Smokeless tobacco: Never   Substance and Sexual Activity    Alcohol use: Not Currently     Comment: occasionally    Drug use: Never    Sexual activity: Not Currently   Social History Narrative    ** Merged History Encounter **           Family History   Problem Relation Name Age of Onset    Diabetes Mother      Colon cancer Mother      Hypertension Father      Diabetes Father        Review of patient's allergies indicates:   Allergen Reactions    Dilaudid [hydromorphone] Nausea And Vomiting      Review of Systems   Constitutional:  Negative for activity change, appetite change, chills, fatigue and fever.   HENT:  Negative for sore throat.    Eyes:  Negative for visual disturbance.   Respiratory:  Negative for cough and shortness of breath.    Cardiovascular:  Negative for chest pain.   Gastrointestinal:  Negative for abdominal pain, constipation, diarrhea, nausea and vomiting.   Endocrine: Negative for polyuria.   Genitourinary:  Negative for dysuria.   Musculoskeletal:  Negative for back pain.   Integumentary:  Negative for rash.    Allergic/Immunologic: Negative for frequent infections.   Neurological:  Negative for weakness and headaches.   Hematological:  Negative for adenopathy. Does not bruise/bleed easily.         Objective:      Vitals:    08/14/24 1338   BP: 118/68   Pulse: 61   Resp: 18   Temp: 97.9 °F (36.6 °C)         Physical Exam  Constitutional:       General: He is not in acute distress.     Appearance: Normal appearance. He is not ill-appearing.   HENT:      Head: Normocephalic and atraumatic.      Nose: Nose normal.      Mouth/Throat:      Mouth: Mucous membranes are moist.      Pharynx: Oropharynx is clear.   Eyes:      Extraocular Movements: Extraocular movements intact.      Conjunctiva/sclera: Conjunctivae normal.      Pupils: Pupils are equal, round, and reactive to light.   Cardiovascular:      Rate and Rhythm: Normal rate and regular rhythm.      Pulses: Normal pulses.      Heart sounds: Normal heart sounds. No murmur heard.  Pulmonary:      Effort: Pulmonary effort is normal. No respiratory distress.      Breath sounds: Normal breath sounds.   Abdominal:      General: There is no distension.      Palpations: Abdomen is soft.      Tenderness: There is no abdominal tenderness.   Musculoskeletal:         General: Normal range of motion.      Cervical back: Normal range of motion and neck supple.      Right lower leg: No edema.      Left lower leg: No edema.   Lymphadenopathy:      Cervical: No cervical adenopathy.   Skin:     General: Skin is warm and dry.   Neurological:      General: No focal deficit present.      Mental Status: He is alert and oriented to person, place, and time.         LABS AND IMAGING REVIEWED IN EPIC      Component      Latest Ref Rng 7/18/2023 1/18/2024 7/31/2024   PTT      23.2 - 33.7 seconds 35.8 (H)  36.5 (H)  34.7 (H)       Legend:  (H) High    Legend:  (H) High    Component      Latest Ref Rng & Units 1/25/2023   Factor IX      59 - 191 % 126   Factor 11 and 12 Activity  Normal    Interpretati   Latest Reference Range & Units 07/31/24 10:18   DRVV Conf Ratio  0.97   DRVV Norm Ratio 0.00 - 1.19  1.38 (H)   DRVV Scr Ratio  1.34        Lupus Anticoag ST Calc 0.0 - 7.9 seconds 2.6   (H): Data is abnormally highve Report for Lupus Anticoagulant Interpretation:  Lupus anticoagulant present.      Legend:  (H) High  Assessment:   Lupus Anticoagulant - No history of thrombosis, this was discovered with preoperative labs showing prolonged PTT in January '22. Mixing study without evidence of correction indicating presence of inhibitor. Lupus anticoagulant positive. No evidence of other Factor deficiency or inhibitor presence. With this he is at not at increased risk of bleeding, but more so increased risk of thrombosis. For this reason I recommend he start anticoagulation, preferably with LMWH as soon as possible after surgery and continue until he is fully ambulatory. Given he has no history of thrombosis there is no role for chronic anticoagulation at this time.    Tolerated surgery well. Lupus anticoagulant still present 12 weeks apart. But, given no history of thrombosis there is no role for anticoagulation at this time. Discussed prolonged immobilization and traveling precautions moving forward.     Plan:       - Continue aspirin 81mg daily  - No role for anticoagulation given no history of thrombosis  - RTC 12 months for NP visit, labs 1 week prior  - Plan to add iron studies at next lab draw  - Precautions discussed in detail    I spent a total of 25 minutes on the day of the visit.This includes face to face time and non-face to face time preparing to see the patient (eg, review of tests), obtaining and/or reviewing separately obtained history, documenting clinical information in the electronic or other health record, independently interpreting results and communicating results to the patient/family/caregiver, or care coordinator.    Natasha Johnsongas  Hematology/Oncology   [facility]

## 2024-09-03 ENCOUNTER — TELEPHONE (OUTPATIENT)
Dept: NEUROSURGERY | Facility: CLINIC | Age: 69
End: 2024-09-03
Payer: MEDICARE

## 2024-09-03 NOTE — TELEPHONE ENCOUNTER
I informed patient that his f/u isn't due until March of 2025 w/ MRI; I explained that a reminder message is set to make sure we call patient and schedule him once the template is open; he was grateful for this information.

## 2025-01-29 ENCOUNTER — TELEPHONE (OUTPATIENT)
Dept: NEUROSURGERY | Facility: CLINIC | Age: 70
End: 2025-01-29
Payer: MEDICARE

## 2025-01-29 NOTE — TELEPHONE ENCOUNTER
Spoke with patient regarding his MRI to be scheduled at [facility], he asked that the facility give him a call to schedule so he can better coordinate. I told him I would go ahead and fax the order per his request. No auth req.

## 2025-01-29 NOTE — TELEPHONE ENCOUNTER
----- Message from Med Assistant Dean sent at 1/29/2025 10:34 AM CST -----  Regarding: FW: 2 year post op F/U and MRI sella (March 2025 appt)  Can you please get authorization and schedule the patients MRI in [place] per message below and contact patient with date and time.  Thank you  ----- Message -----  From: Dianne Sun MA  Sent: 1/7/2025  10:48 AM CST  To: Dianne Sun MA  Subject: RE: 2 year post op F/U and MRI sella (March #    The patient is scheduled to see Dr. Vasquez on 3/24/25 at 1:30.  He asked to have his MRI in [place] at [facility] in the early morning.   I will call to schedule that for him.  The patient is aware that he will need to bring a CD of his images to the appointment.  I will also call Dr. Doshi and Dr. Lopez's office for updated notes.    ----- Message -----  From: Dianne Snu MA  Sent: 12/30/2024   9:00 AM CST  To: Dianne Sun MA; Christina SNOW Staff  Subject: 2 year post op F/U and MRI sella (March 2025#    The patient was seen by Dr. Vasquez on 3/20/24 where he recommended that he follow up at 2 years PO (sx 3/28/23) with Dr. Vasquez with an updated MRI sella w/wo.  His next appointment will be with Dr. Ramírez.

## 2025-03-24 ENCOUNTER — OFFICE VISIT (OUTPATIENT)
Dept: NEUROSURGERY | Facility: CLINIC | Age: 70
End: 2025-03-24
Payer: MEDICARE

## 2025-03-24 VITALS
RESPIRATION RATE: 16 BRPM | DIASTOLIC BLOOD PRESSURE: 85 MMHG | HEART RATE: 61 BPM | HEIGHT: 65 IN | BODY MASS INDEX: 29.66 KG/M2 | SYSTOLIC BLOOD PRESSURE: 143 MMHG | WEIGHT: 178 LBS

## 2025-03-24 DIAGNOSIS — D35.2 PITUITARY ADENOMA: Primary | ICD-10-CM

## 2025-03-24 PROCEDURE — 99213 OFFICE O/P EST LOW 20 MIN: CPT | Mod: ,,, | Performed by: NEUROLOGICAL SURGERY

## 2025-03-24 RX ORDER — INDOMETHACIN 50 MG/1
50 CAPSULE ORAL 3 TIMES DAILY
COMMUNITY
Start: 2025-03-06

## 2025-03-24 RX ORDER — ASPIRIN 81 MG/1
81 TABLET ORAL DAILY
COMMUNITY

## 2025-03-24 RX ORDER — TESTOSTERONE CYPIONATE 200 MG/ML
INJECTION, SOLUTION INTRAMUSCULAR
COMMUNITY
Start: 2025-02-17

## 2025-03-24 RX ORDER — OXYCODONE AND ACETAMINOPHEN 5; 325 MG/1; MG/1
1 TABLET ORAL EVERY 6 HOURS PRN
COMMUNITY
Start: 2025-03-07

## 2025-03-24 RX ORDER — GABAPENTIN 300 MG/1
300 CAPSULE ORAL 3 TIMES DAILY
COMMUNITY
Start: 2025-03-10

## 2025-03-24 RX ORDER — LACTULOSE 10 G/15ML
SOLUTION ORAL; RECTAL
COMMUNITY
Start: 2024-12-18

## 2025-03-24 RX ORDER — TADALAFIL 5 MG/1
5 TABLET ORAL
COMMUNITY
Start: 2025-03-14

## 2025-03-24 RX ORDER — HYDROCODONE BITARTRATE AND ACETAMINOPHEN 10; 325 MG/1; MG/1
1 TABLET ORAL EVERY 6 HOURS PRN
COMMUNITY
Start: 2024-11-14 | End: 2025-03-24

## 2025-03-24 NOTE — PROGRESS NOTES
[facility]  Neurosurgery        Jacques Garcia   [id number]   1955       SUBJECTIVE:     CHIEF COMPLAINT:    2 years post-op    HPI:    Jacques Garcia is a 70 y.o.-year-old male who presents today for post-operative follow-up.  He is s/p transsphenoidal resection of pituitary tumor that was done on 3/28/23.  He continues to follow up regularly with Dr. Lopez and remains on thyroid medication and testosterone.  He denies any new or worsening symptoms currently.        Review of patient's allergies indicates:   Allergen Reactions    Dilaudid [hydromorphone] Nausea And Vomiting     Current Outpatient Medications   Medication Sig Dispense Refill    albuterol (PROVENTIL/VENTOLIN HFA) 90 mcg/actuation inhaler Inhale 2 puffs into the lungs every 4 (four) hours as needed.      aspirin (ECOTRIN) 81 MG EC tablet Take 81 mg by mouth once daily.      CALCIUM CITRATE-VITAMIN D3 ORAL BID      diphenhydrAMINE (SOMINEX) 25 mg tablet Take 25 mg by mouth nightly as needed for Insomnia.      ezetimibe (ZETIA) 10 mg tablet Take 10 mg by mouth.      fluticasone propionate (FLOVENT DISKUS) 50 mcg/actuation DsDv Inhale 2 puffs into the lungs 2 (two) times a day. Controller      gabapentin (NEURONTIN) 300 MG capsule Take 300 mg by mouth 3 (three) times daily.      indomethacin (INDOCIN) 50 MG capsule Take 50 mg by mouth 3 (three) times daily.      lactulose (CHRONULAC) 10 gram/15 mL solution SMARTSIG:15 Milliliter(s) By Mouth Daily PRN      levocetirizine (XYZAL) 5 MG tablet Take 5 mg by mouth every evening.      levothyroxine (SYNTHROID) 50 MCG tablet TAKE 1 TABLET BY MOUTH IN THE MORNING ON AN EMPTY STOMACH      linaCLOtide (LINZESS) 72 mcg Cap capsule Take 72 mcg by mouth as needed.      losartan (COZAAR) 100 MG tablet Take 100 mg by mouth.      metFORMIN (GLUCOPHAGE) 500 MG tablet Take 500 mg by mouth 2 (two) times daily with meals.      oxyCODONE-acetaminophen (PERCOCET) 5-325 mg per tablet Take 1 tablet by  mouth every 6 (six) hours as needed.      polyethylene glycol 400 (VISINE DRY EYE RELIEF OPHT) Apply to eye 3 (three) times daily as needed.      rosuvastatin (CRESTOR) 40 MG Tab Take 40 mg by mouth.      sildenafiL (VIAGRA) 100 MG tablet Take 100 mg by mouth.      tadalafiL (CIALIS) 5 MG tablet Take 5 mg by mouth.      testosterone cypionate (DEPOTESTOTERONE CYPIONATE) 200 mg/mL injection SMARTSI.5 Milliliter(s) IM Once a Week      tiZANidine (ZANAFLEX) 2 MG tablet Take 2 mg by mouth.      meloxicam (MOBIC) 15 MG tablet Take 15 mg by mouth. (Patient not taking: Reported on 3/24/2025)       No current facility-administered medications for this visit.     Past Medical History:   Diagnosis Date    Asthma     Benign neoplasm of pituitary gland     BPH (benign prostatic hyperplasia)     Cervical disc disorder     Dizziness     DM (diabetes mellitus)     Heartburn     History of kidney stones     HLD (hyperlipidemia)     HTN (hypertension)     Insomnia     Kidney stones     Numbness of tongue     Prostate cancer 2016    treated with radiation    SOB (shortness of breath)     at times    Spondylosis of cervical region without myelopathy or radiculopathy      Past Surgical History:   Procedure Laterality Date    CATARACT EXTRACTION Bilateral     COLONOSCOPY      EPIDURAL STEROID INJECTION INTO CERVICAL SPINE      multiple    EXCISION, NEOPLASM, PITUITARY, TRANSSPHENOIDAL APPROACH, USING COMPUTER-ASSISTED NAVIGATION N/A 3/28/2023    Procedure: EXCISION, NEOPLASM, PITUITARY, TRANSSPHENOIDAL APPROACH, USING COMPUTER-ASSISTED NAVIGATION;  Surgeon: Jassi Vasquez MD;  Location: [facility];  Service: Neurosurgery;  Laterality: N/A;  Endoscopic transnasal, thrassphenoidal pituitary resection  [facility]    EXCISION, NEOPLASM, PITUITARY, TRANSSPHENOIDAL APPROACH, USING COMPUTER-ASSISTED NAVIGATION N/A 3/28/2023    Procedure: EXCISION, NEOPLASM, PITUITARY, TRANSSPHENOIDAL APPROACH, USING COMPUTER-ASSISTED NAVIGATION;  Surgeon: Champ KLEIN  "MD [place];  Location: [facility];  Service: ENT;  Laterality: N/A;    MULTIPLE TOOTH EXTRACTIONS      neck cyst       Family History       Problem Relation (Age of Onset)    Colon cancer Mother    Diabetes Mother, Father    Hypertension Father          Social History     Socioeconomic History    Marital status:    Tobacco Use    Smoking status: Never    Smokeless tobacco: Never   Substance and Sexual Activity    Alcohol use: Not Currently     Comment: occasionally    Drug use: Never    Sexual activity: Not Currently   Social History Narrative    ** Merged History Encounter **              Review of systems:    Pertinent items are noted in HPI.      OBJECTIVE:     Vital Signs  Pulse: 61  Resp: 16  BP: (!) 143/85  Pain Score:   4  Height: 5' 5" (165.1 cm)  Weight: 80.7 kg (178 lb)  Body mass index is 29.62 kg/m².      Physical Exam:    General:  Pleasant. Well-nourished. Alert. No acute distress.    Head:  Normocephalic, without obvious abnormality, atraumatic    Lungs:   Breathing is quiet, non-lablored    Neurological:    Oriented to Person, Place, Time   Speech:  normal  Memory, cognition, and affect are appropriate.  Extraocular movements are intact.  Movements of facial expression are intact and symmetric.  Motor Strength: Moves all extremities spontaneously with good tone.  No abnormal movements seen.      Gait:  normal    MRI from 03/13/2025 shows no obvious residual/recurrent tumor.    ASSESSMENT/PLAN:     1. Pituitary adenoma  - MRI Pituitary W W/O Contrast; Future  - Follow up 3 years post op w/ MRI (sx 3/28/23)        I, Dr. Jassi Vasquez, personally performed the services described in this documentation. All medical record entries made by the scribe, Brandy Luz RN, were at my direction and in my presence.  I have reviewed the chart and agree that the record reflects my personal performance and is accurate and complete. Jassi Vasquez MD.  9:18 AM 03/24/2025       Jassi Vasquez MD FACS [facility]         "

## 2025-08-07 ENCOUNTER — LAB VISIT (OUTPATIENT)
Dept: LAB | Facility: HOSPITAL | Age: 70
End: 2025-08-07
Attending: STUDENT IN AN ORGANIZED HEALTH CARE EDUCATION/TRAINING PROGRAM
Payer: MEDICARE

## 2025-08-07 DIAGNOSIS — D68.62 LUPUS ANTICOAGULANT INHIBITOR SYNDROME: ICD-10-CM

## 2025-08-07 DIAGNOSIS — R79.1 ELEVATED PARTIAL THROMBOPLASTIN TIME (PTT): ICD-10-CM

## 2025-08-07 LAB
ALBUMIN SERPL-MCNC: 3.6 G/DL (ref 3.4–4.8)
ALBUMIN/GLOB SERPL: 1.2 RATIO (ref 1.1–2)
ALP SERPL-CCNC: 47 UNIT/L (ref 40–150)
ALT SERPL-CCNC: 13 UNIT/L (ref 0–55)
ANION GAP SERPL CALC-SCNC: 11 MEQ/L
APTT PPP: 34.7 SECONDS (ref 23.2–33.7)
AST SERPL-CCNC: 13 UNIT/L (ref 11–45)
BASOPHILS # BLD AUTO: 0 X10(3)/MCL
BASOPHILS NFR BLD AUTO: 0 %
BILIRUB SERPL-MCNC: 0.5 MG/DL
BUN SERPL-MCNC: 14.1 MG/DL (ref 8.4–25.7)
CALCIUM SERPL-MCNC: 9 MG/DL (ref 8.8–10)
CHLORIDE SERPL-SCNC: 106 MMOL/L (ref 98–107)
CO2 SERPL-SCNC: 25 MMOL/L (ref 23–31)
CREAT SERPL-MCNC: 0.95 MG/DL (ref 0.72–1.25)
CREAT/UREA NIT SERPL: 15
EOSINOPHIL # BLD AUTO: 0.27 X10(3)/MCL (ref 0–0.9)
EOSINOPHIL NFR BLD AUTO: 4.4 %
ERYTHROCYTE [DISTWIDTH] IN BLOOD BY AUTOMATED COUNT: 17.9 % (ref 11.5–17)
GFR SERPLBLD CREATININE-BSD FMLA CKD-EPI: >60 ML/MIN/1.73/M2
GLOBULIN SER-MCNC: 3 GM/DL (ref 2.4–3.5)
GLUCOSE SERPL-MCNC: 88 MG/DL (ref 82–115)
HCT VFR BLD AUTO: 43.3 % (ref 42–52)
HGB BLD-MCNC: 13.8 G/DL (ref 14–18)
IMM GRANULOCYTES # BLD AUTO: 0.02 X10(3)/MCL (ref 0–0.04)
IMM GRANULOCYTES NFR BLD AUTO: 0.3 %
LYMPHOCYTES # BLD AUTO: 1.3 X10(3)/MCL (ref 0.6–4.6)
LYMPHOCYTES NFR BLD AUTO: 21.1 %
MCH RBC QN AUTO: 27.1 PG (ref 27–31)
MCHC RBC AUTO-ENTMCNC: 31.9 G/DL (ref 33–36)
MCV RBC AUTO: 85.1 FL (ref 80–94)
MONOCYTES # BLD AUTO: 0.61 X10(3)/MCL (ref 0.1–1.3)
MONOCYTES NFR BLD AUTO: 9.9 %
NEUTROPHILS # BLD AUTO: 3.95 X10(3)/MCL (ref 2.1–9.2)
NEUTROPHILS NFR BLD AUTO: 64.3 %
PLATELET # BLD AUTO: 226 X10(3)/MCL (ref 130–400)
PMV BLD AUTO: 9.5 FL (ref 7.4–10.4)
POTASSIUM SERPL-SCNC: 4.3 MMOL/L (ref 3.5–5.1)
PROT SERPL-MCNC: 6.6 GM/DL (ref 5.8–7.6)
RBC # BLD AUTO: 5.09 X10(6)/MCL (ref 4.7–6.1)
SODIUM SERPL-SCNC: 142 MMOL/L (ref 136–145)
TT IMM BOVINE THROMBIN PPP: 17 SECONDS (ref 0–22)
WBC # BLD AUTO: 6.15 X10(3)/MCL (ref 4.5–11.5)

## 2025-08-07 PROCEDURE — 85670 THROMBIN TIME PLASMA: CPT

## 2025-08-07 PROCEDURE — 80053 COMPREHEN METABOLIC PANEL: CPT

## 2025-08-07 PROCEDURE — 85025 COMPLETE CBC W/AUTO DIFF WBC: CPT

## 2025-08-07 PROCEDURE — 36415 COLL VENOUS BLD VENIPUNCTURE: CPT

## 2025-08-07 PROCEDURE — 85730 THROMBOPLASTIN TIME PARTIAL: CPT

## 2025-08-14 ENCOUNTER — OFFICE VISIT (OUTPATIENT)
Dept: HEMATOLOGY/ONCOLOGY | Facility: CLINIC | Age: 70
End: 2025-08-14
Payer: MEDICARE

## 2025-08-14 VITALS
TEMPERATURE: 98 F | BODY MASS INDEX: 28.18 KG/M2 | WEIGHT: 169.13 LBS | HEIGHT: 65 IN | RESPIRATION RATE: 18 BRPM | DIASTOLIC BLOOD PRESSURE: 84 MMHG | OXYGEN SATURATION: 97 % | HEART RATE: 70 BPM | SYSTOLIC BLOOD PRESSURE: 159 MMHG

## 2025-08-14 DIAGNOSIS — D68.62 LUPUS ANTICOAGULANT INHIBITOR SYNDROME: ICD-10-CM

## 2025-08-14 DIAGNOSIS — R79.1 ELEVATED PARTIAL THROMBOPLASTIN TIME (PTT): Primary | ICD-10-CM

## 2025-08-14 PROCEDURE — 99215 OFFICE O/P EST HI 40 MIN: CPT | Mod: PBBFAC

## 2025-08-14 PROCEDURE — 99999 PR PBB SHADOW E&M-EST. PATIENT-LVL V: CPT | Mod: PBBFAC,,,

## 2025-08-14 RX ORDER — TRAZODONE HYDROCHLORIDE 50 MG/1
50-100 TABLET ORAL NIGHTLY
COMMUNITY
Start: 2025-07-30

## (undated) DEVICE — DRAPE OPTHALMIC W/POUCH

## (undated) DEVICE — HANDLE DEVON RIGID OR LIGHT

## (undated) DEVICE — COVER MAYO STAND REINFRCD 30

## (undated) DEVICE — TUBE SUCTION MEDI-VAC STERILE

## (undated) DEVICE — NDL QUINCKE SPINAL YEL 20GX3IN

## (undated) DEVICE — SPONGE PATTY SURGICAL .5X3IN

## (undated) DEVICE — TOWEL OR BLUE STRL 16X26 4/PK

## (undated) DEVICE — SHEATH CLN ENDOSCRB 4MM

## (undated) DEVICE — SOL NACL IRR 1000ML BTL

## (undated) DEVICE — CLOSURE SKIN STERI STRIP 1/2X4

## (undated) DEVICE — PENCIL ELECSURG ROCKER 15FT

## (undated) DEVICE — HANDSWITCH SUCTION COAG

## (undated) DEVICE — DRAPE STERI INSTRUMENT 1018

## (undated) DEVICE — TRAY CATH FOL SIL URIMTR 16FR

## (undated) DEVICE — SPONGE SURGIFOAM 100 8.5X12X10

## (undated) DEVICE — ELECTRODE BLD EXT 6.50 ST MDFD

## (undated) DEVICE — SEE MEDLINE ITEM 146292

## (undated) DEVICE — GOWN SMARTSLEEVE AAMI LVL4 LG

## (undated) DEVICE — NDL QUINCKE SPINAL 25G 3.5IN

## (undated) DEVICE — DRAPE FLUID WARMER 44X44IN

## (undated) DEVICE — ELECTRODE ELECSURG 4INX3MM

## (undated) DEVICE — DRAPE TOP 53X102IN

## (undated) DEVICE — SHEET DRAPE MEDIUM

## (undated) DEVICE — DRAPE BAG ISOLATION 20 X 20

## (undated) DEVICE — ELECTRODE PATIENT RETURN DISP

## (undated) DEVICE — Device

## (undated) DEVICE — POSITIONER HEEL FOAM CONVOLTD

## (undated) DEVICE — SOL NORMAL USPCA 0.9%

## (undated) DEVICE — HOLDER STRIP-T SELF ADH 2X10IN

## (undated) DEVICE — CLEANER LENS FOGBEATER ALPENCO

## (undated) DEVICE — BLADE QUADCUT STRAIGHT 4.3MM

## (undated) DEVICE — KIT SURGIFLO HEMOSTATIC MATRIX

## (undated) DEVICE — DURAPREP SURG SCRUB 26ML

## (undated) DEVICE — CONTAINER SPECIMEN SCREW 4OZ

## (undated) DEVICE — SPONGE NEURO 1/4X1/4

## (undated) DEVICE — DISH PETRI MED 3.5IN

## (undated) DEVICE — COVER HD BACK TABLE 6FT

## (undated) DEVICE — SEALANT ADHERUS AUTOSPRY DURAL

## (undated) DEVICE — GLOVE PROTEXIS HYDROGEL SZ7.5

## (undated) DEVICE — SYR BULB EAR/ULCER STER 3OZ

## (undated) DEVICE — SEE MEDLINE ITEM 157059

## (undated) DEVICE — DRAPE INCISE IOBAN 2 13X13IN

## (undated) DEVICE — DRAPE TOWEL TIBURON 19X30IN

## (undated) DEVICE — SPHERE NDI PASSIVE